# Patient Record
Sex: MALE | Race: WHITE | NOT HISPANIC OR LATINO | Employment: OTHER | URBAN - NONMETROPOLITAN AREA
[De-identification: names, ages, dates, MRNs, and addresses within clinical notes are randomized per-mention and may not be internally consistent; named-entity substitution may affect disease eponyms.]

---

## 2017-02-27 ENCOUNTER — OFFICE VISIT (OUTPATIENT)
Dept: SURGERY | Facility: CLINIC | Age: 72
End: 2017-02-27

## 2017-02-27 VITALS
WEIGHT: 194 LBS | HEIGHT: 73 IN | BODY MASS INDEX: 25.71 KG/M2 | SYSTOLIC BLOOD PRESSURE: 120 MMHG | DIASTOLIC BLOOD PRESSURE: 80 MMHG

## 2017-02-27 DIAGNOSIS — R10.30 CHRONIC GROIN PAIN, UNSPECIFIED LATERALITY: ICD-10-CM

## 2017-02-27 DIAGNOSIS — G89.29 CHRONIC GROIN PAIN, UNSPECIFIED LATERALITY: ICD-10-CM

## 2017-02-27 DIAGNOSIS — Z98.890 HX OF INGUINAL HERNIA REPAIR: Primary | ICD-10-CM

## 2017-02-27 DIAGNOSIS — Z87.19 HX OF INGUINAL HERNIA REPAIR: Primary | ICD-10-CM

## 2017-02-27 PROCEDURE — 99212 OFFICE O/P EST SF 10 MIN: CPT | Performed by: SURGERY

## 2017-02-27 RX ORDER — OXYCODONE HYDROCHLORIDE AND ACETAMINOPHEN 5; 325 MG/1; MG/1
1 TABLET ORAL EVERY 6 HOURS PRN
COMMUNITY
End: 2017-08-28

## 2017-02-27 RX ORDER — SENNOSIDES 8.6 MG
650 CAPSULE ORAL EVERY 8 HOURS PRN
COMMUNITY
End: 2019-03-21

## 2017-02-27 RX ORDER — FAMOTIDINE 20 MG/1
20 TABLET, FILM COATED ORAL DAILY
COMMUNITY

## 2017-02-27 NOTE — PROGRESS NOTES
Chief Complaint   Patient presents with   • Follow-up     Recheck right groin pain.        Abdominal Pain   This is a chronic problem. The current episode started more than 1 year ago. The onset quality is gradual. The problem occurs intermittently. The problem has been gradually improving. The pain is located in the LLQ and RLQ. The abdominal pain does not radiate. Associated symptoms include arthralgias and dysuria. Pertinent negatives include no anorexia, constipation, diarrhea, fever, hematochezia, myalgias, nausea or vomiting. Nothing aggravates the pain. Relieved by: Pain stimulator. The treatment provided moderate relief.       Recent back surgery. No bulges in the groin. Chronic pain, improved with stimulator  Implant.    Past Medical History   Diagnosis Date   • Abdominal pain      no hernias noted- multiple repairs      • Asthma    • Backache    • Cataract    • Constipation    • DJD (degenerative joint disease)      involving multiple joints   • Dyspnea      with exertion   • Emphysema of lung    • Essential hypertension    • Generalized anxiety disorder    • Hallux valgus      right 2nd toe   • Hearing loss    • Heartburn    • Hemorrhoids    • Incisional hernia with obstruction, without gangrene    • Malignant tumor of prostate    • Plantar fasciitis      right heel   • PVD (peripheral vascular disease)    • Sleep disorder    • Urinary incontinence        Past Surgical History   Procedure Laterality Date   • Endoscopy and colonoscopy  09/19/2011     Diverticulosis in sigmoid colon. Hemorrhoids in anus. Edema of rectal mucosa. Multiple biopsies taken. Lipoma in cecum. Multiple biopsies taken   • Transesophageal echocardiogram ([facility])  07/09/2008     without color flow-Normal systolic function with evidence of left ventricular hypertrophy   • Prostatectomy     • Hernia repair  08/17/2004     Left inguinal hernioplasty with mesh   • Umbilical hernia repair  09/12/2006     Primary umbilical hernioplasty          Current Outpatient Prescriptions:   •  acetaminophen (TYLENOL) 650 MG 8 hr tablet, Take 650 mg by mouth., Disp: , Rfl:   •  albuterol (PROVENTIL HFA;VENTOLIN HFA) 108 (90 BASE) MCG/ACT inhaler, Inhale 2 puffs Every 4 (Four) Hours As Needed for wheezing., Disp: , Rfl:   •  aluminum hydroxide-mag trisilicate (GAVISCON) 80-20 MG chewable tablet chewable tablet, Chew 1 tablet 3 (Three) Times a Day., Disp: , Rfl:   •  aspirin 81 MG EC tablet, Take 81 mg by mouth Daily., Disp: , Rfl:   •  azithromycin (ZITHROMAX) 250 MG tablet, , Disp: , Rfl: 6  •  bumetanide (BUMEX) 2 MG tablet, , Disp: , Rfl: 3  •  carvedilol (COREG) 12.5 MG tablet, , Disp: , Rfl:   •  clonazePAM (KlonoPIN) 0.5 MG tablet, , Disp: , Rfl:   •  clopidogrel (PLAVIX) 75 MG tablet, Take 75 mg by mouth Daily., Disp: , Rfl:   •  COMBIVENT RESPIMAT  MCG/ACT inhaler, , Disp: , Rfl: 2  •  cyclobenzaprine (FLEXERIL) 10 MG tablet, Take 10 mg by mouth 3 (Three) Times a Day As Needed for muscle spasms., Disp: , Rfl:   •  DALIRESP 500 MCG tablet tablet, , Disp: , Rfl: 11  •  DULERA 200-5 MCG/ACT inhaler, , Disp: , Rfl: 11  •  famotidine (PEPCID) 20 MG tablet, Take 20 mg by mouth., Disp: , Rfl:   •  furosemide (LASIX) 40 MG tablet, Take 40 mg by mouth Daily., Disp: , Rfl:   •  ipratropium-albuterol (COMBIVENT)  MCG/ACT inhaler, Inhale 2 puffs Every 4 (Four) Hours As Needed for wheezing., Disp: , Rfl:   •  ipratropium-albuterol (DUO-NEB) 0.5-2.5 mg/mL nebulizer, , Disp: , Rfl: 6  •  isosorbide dinitrate (ISORDIL) 30 MG tablet, Take 30 mg by mouth Daily., Disp: , Rfl:   •  isosorbide mononitrate (IMDUR) 30 MG 24 hr tablet, , Disp: , Rfl:   •  lisinopril (PRINIVIL,ZESTRIL) 2.5 MG tablet, Take 2.5 mg by mouth Daily., Disp: , Rfl:   •  Mometasone Furo-Formoterol Fum (DULERA IN), Inhale 2 puffs 2 (Two) Times a Day., Disp: , Rfl:   •  MUCINEX 600 MG 12 hr tablet, , Disp: , Rfl: 4  •  nitroglycerin (NITROSTAT) 0.4 MG SL tablet, Place 0.4 mg under the tongue  Every 5 (Five) Minutes As Needed for chest pain. Take no more than 3 doses in 15 minutes., Disp: , Rfl:   •  oxyCODONE-acetaminophen (PERCOCET) 5-325 MG per tablet, Take 1 tablet by mouth Every 6 (Six) Hours As Needed., Disp: , Rfl:   •  potassium chloride (K-DUR) 10 MEQ CR tablet, , Disp: , Rfl:   •  pravastatin (PRAVACHOL) 40 MG tablet, Take 40 mg by mouth Every Night., Disp: , Rfl:   •  pregabalin (LYRICA) 150 MG capsule, Take 150 mg by mouth 2 (Two) Times a Day., Disp: , Rfl:   •  promethazine (PHENERGAN) 25 MG tablet, , Disp: , Rfl:   •  SPIRIVA RESPIMAT 2.5 MCG/ACT aerosol solution, , Disp: , Rfl: 11  •  sucralfate (CARAFATE) 1 G tablet, Take 1 g by mouth 3 (Three) Times a Day., Disp: , Rfl:   •  carvedilol (COREG) 3.125 MG tablet, Take 3.125 mg by mouth 2 (Two) Times a Day With Meals., Disp: , Rfl:   •  potassium chloride (K-DUR,KLOR-CON) 10 MEQ CR tablet, Take 10 mEq by mouth 2 (Two) Times a Day., Disp: , Rfl:   •  tapentadol (NUCYNTA) 75 MG tablet, Take 75 mg by mouth 4 (Four) Times a Day As Needed., Disp: , Rfl:     Allergies   Allergen Reactions   • Codeine Nausea And Vomiting   • Contrast Dye        Family History   Problem Relation Age of Onset   • Cancer Other    • Diabetes Other    • Heart disease Other    • Hypertension Other        Social History     Social History   • Marital status:      Spouse name: N/A   • Number of children: N/A   • Years of education: N/A     Occupational History   • Not on file.     Social History Main Topics   • Smoking status: Former Smoker   • Smokeless tobacco: Not on file   • Alcohol use No   • Drug use: Not on file   • Sexual activity: Not on file     Other Topics Concern   • Not on file     Social History Narrative       Review of Systems   Constitutional: Negative for fever.   Gastrointestinal: Positive for abdominal pain. Negative for abdominal distention, anal bleeding, anorexia, blood in stool, constipation, diarrhea, hematochezia, nausea and vomiting.    Genitourinary: Positive for dysuria.   Musculoskeletal: Positive for arthralgias and back pain. Negative for gait problem, myalgias and neck pain.       Physical Exam   Abdominal: Soft. Normal appearance and normal aorta.           ASSESSMENT    Claudis was seen today for follow-up.    Diagnoses and all orders for this visit:    Hx of inguinal hernia repair    Chronic groin pain, unspecified laterality      PLAN    1. Recheck in 4 months          This document has been electronically signed by Derik Michelle MD on February 27, 2017 10:47 AM

## 2017-06-26 ENCOUNTER — OFFICE VISIT (OUTPATIENT)
Dept: SURGERY | Facility: CLINIC | Age: 72
End: 2017-06-26

## 2017-06-26 VITALS
HEIGHT: 73 IN | BODY MASS INDEX: 25.98 KG/M2 | WEIGHT: 196 LBS | DIASTOLIC BLOOD PRESSURE: 70 MMHG | SYSTOLIC BLOOD PRESSURE: 140 MMHG

## 2017-06-26 DIAGNOSIS — R10.30 GROIN PAIN, UNSPECIFIED LATERALITY: Primary | ICD-10-CM

## 2017-06-26 PROCEDURE — 99212 OFFICE O/P EST SF 10 MIN: CPT | Performed by: SURGERY

## 2017-06-30 NOTE — PROGRESS NOTES
Chief Complaint   Patient presents with   • Follow-up     Recheck right groin pain        HPI  This is a chronic problem that is unchanged in the last 4 months.. The current episode started more than 1 year ago. The onset quality is gradual. The problem occurs intermittently. The problem has been gradually improving. The pain is located in the LLQ and RLQ. The abdominal pain does not radiate. Associated symptoms include arthralgias and dysuria. Pertinent negatives include no anorexia, constipation, diarrhea, fever, hematochezia, myalgias, nausea or vomiting. Nothing aggravates the pain. Relieved by: Pain stimulator- this has been recently adjusted. The treatment provided moderate relief.    No bules- no hx of incarceration or obstruction.  Past Medical History:   Diagnosis Date   • Abdominal pain     no hernias noted- multiple repairs      • Asthma    • Backache    • Cataract    • Constipation    • DJD (degenerative joint disease)     involving multiple joints   • Dyspnea     with exertion   • Emphysema of lung    • Essential hypertension    • Generalized anxiety disorder    • Hallux valgus     right 2nd toe   • Hearing loss    • Heartburn    • Hemorrhoids    • Incisional hernia with obstruction, without gangrene    • Malignant tumor of prostate    • Plantar fasciitis     right heel   • PVD (peripheral vascular disease)    • Sleep disorder    • Urinary incontinence        Past Surgical History:   Procedure Laterality Date   • ENDOSCOPY AND COLONOSCOPY  09/19/2011    Diverticulosis in sigmoid colon. Hemorrhoids in anus. Edema of rectal mucosa. Multiple biopsies taken. Lipoma in cecum. Multiple biopsies taken   • HERNIA REPAIR  08/17/2004    Left inguinal hernioplasty with mesh   • PROSTATECTOMY     • TRANSESOPHAGEAL ECHOCARDIOGRAM ([place])  07/09/2008    without color flow-Normal systolic function with evidence of left ventricular hypertrophy   • UMBILICAL HERNIA REPAIR  09/12/2006    Primary umbilical hernioplasty          Current Outpatient Prescriptions:   •  acetaminophen (TYLENOL) 650 MG 8 hr tablet, Take 650 mg by mouth., Disp: , Rfl:   •  albuterol (PROVENTIL HFA;VENTOLIN HFA) 108 (90 BASE) MCG/ACT inhaler, Inhale 2 puffs Every 4 (Four) Hours As Needed for wheezing., Disp: , Rfl:   •  aluminum hydroxide-mag trisilicate (GAVISCON) 80-20 MG chewable tablet chewable tablet, Chew 1 tablet 3 (Three) Times a Day., Disp: , Rfl:   •  aspirin 81 MG EC tablet, Take 81 mg by mouth Daily., Disp: , Rfl:   •  azithromycin (ZITHROMAX) 250 MG tablet, , Disp: , Rfl: 6  •  bumetanide (BUMEX) 2 MG tablet, , Disp: , Rfl: 3  •  carvedilol (COREG) 12.5 MG tablet, , Disp: , Rfl:   •  carvedilol (COREG) 3.125 MG tablet, Take 3.125 mg by mouth 2 (Two) Times a Day With Meals., Disp: , Rfl:   •  clonazePAM (KlonoPIN) 0.5 MG tablet, , Disp: , Rfl:   •  clopidogrel (PLAVIX) 75 MG tablet, Take 75 mg by mouth Daily., Disp: , Rfl:   •  COMBIVENT RESPIMAT  MCG/ACT inhaler, , Disp: , Rfl: 2  •  cyclobenzaprine (FLEXERIL) 10 MG tablet, Take 10 mg by mouth 3 (Three) Times a Day As Needed for muscle spasms., Disp: , Rfl:   •  DALIRESP 500 MCG tablet tablet, , Disp: , Rfl: 11  •  DULERA 200-5 MCG/ACT inhaler, , Disp: , Rfl: 11  •  famotidine (PEPCID) 20 MG tablet, Take 20 mg by mouth., Disp: , Rfl:   •  furosemide (LASIX) 40 MG tablet, Take 40 mg by mouth Daily., Disp: , Rfl:   •  ipratropium-albuterol (COMBIVENT)  MCG/ACT inhaler, Inhale 2 puffs Every 4 (Four) Hours As Needed for wheezing., Disp: , Rfl:   •  ipratropium-albuterol (DUO-NEB) 0.5-2.5 mg/mL nebulizer, , Disp: , Rfl: 6  •  isosorbide dinitrate (ISORDIL) 30 MG tablet, Take 30 mg by mouth Daily., Disp: , Rfl:   •  isosorbide mononitrate (IMDUR) 30 MG 24 hr tablet, , Disp: , Rfl:   •  lisinopril (PRINIVIL,ZESTRIL) 2.5 MG tablet, Take 2.5 mg by mouth Daily., Disp: , Rfl:   •  Mometasone Furo-Formoterol Fum (DULERA IN), Inhale 2 puffs 2 (Two) Times a Day., Disp: , Rfl:   •  MUCINEX 600 MG  12 hr tablet, , Disp: , Rfl: 4  •  nitroglycerin (NITROSTAT) 0.4 MG SL tablet, Place 0.4 mg under the tongue Every 5 (Five) Minutes As Needed for chest pain. Take no more than 3 doses in 15 minutes., Disp: , Rfl:   •  oxyCODONE-acetaminophen (PERCOCET) 5-325 MG per tablet, Take 1 tablet by mouth Every 6 (Six) Hours As Needed., Disp: , Rfl:   •  potassium chloride (K-DUR) 10 MEQ CR tablet, , Disp: , Rfl:   •  potassium chloride (K-DUR,KLOR-CON) 10 MEQ CR tablet, Take 10 mEq by mouth 2 (Two) Times a Day., Disp: , Rfl:   •  pravastatin (PRAVACHOL) 40 MG tablet, Take 40 mg by mouth Every Night., Disp: , Rfl:   •  pregabalin (LYRICA) 150 MG capsule, Take 150 mg by mouth 2 (Two) Times a Day., Disp: , Rfl:   •  promethazine (PHENERGAN) 25 MG tablet, , Disp: , Rfl:   •  SPIRIVA RESPIMAT 2.5 MCG/ACT aerosol solution, , Disp: , Rfl: 11  •  sucralfate (CARAFATE) 1 G tablet, Take 1 g by mouth 3 (Three) Times a Day., Disp: , Rfl:   •  tapentadol (NUCYNTA) 75 MG tablet, Take 75 mg by mouth 4 (Four) Times a Day As Needed., Disp: , Rfl:     Allergies   Allergen Reactions   • Codeine Nausea And Vomiting   • Contrast Dye        Family History   Problem Relation Age of Onset   • Cancer Other    • Diabetes Other    • Heart disease Other    • Hypertension Other        Social History     Social History   • Marital status:      Spouse name: N/A   • Number of children: N/A   • Years of education: N/A     Occupational History   • Not on file.     Social History Main Topics   • Smoking status: Former Smoker   • Smokeless tobacco: Not on file   • Alcohol use No   • Drug use: Not on file   • Sexual activity: Not on file     Other Topics Concern   • Not on file     Social History Narrative       Review of Systems  Nothing ot add  Physical Exam   Abdominal: Soft. Bowel sounds are normal. He exhibits no distension and no mass. There is no tenderness. There is no rebound and no guarding.         ASSESSMENT    Angelita was seen today for  follow-up.    Diagnoses and all orders for this visit:    Groin pain, unspecified laterality      PLAN    1. Recheck in 4 months          This document has been electronically signed by Derik Michelle MD on June 29, 2017 10:14 PM

## 2017-08-28 ENCOUNTER — OFFICE VISIT (OUTPATIENT)
Dept: SURGERY | Facility: CLINIC | Age: 72
End: 2017-08-28

## 2017-08-28 VITALS
HEIGHT: 73 IN | SYSTOLIC BLOOD PRESSURE: 124 MMHG | WEIGHT: 188 LBS | DIASTOLIC BLOOD PRESSURE: 68 MMHG | BODY MASS INDEX: 24.92 KG/M2

## 2017-08-28 DIAGNOSIS — K43.2 INCISIONAL HERNIA, WITHOUT OBSTRUCTION OR GANGRENE: Primary | ICD-10-CM

## 2017-08-28 PROCEDURE — 99213 OFFICE O/P EST LOW 20 MIN: CPT | Performed by: SURGERY

## 2017-08-28 RX ORDER — ALBUTEROL SULFATE 2.5 MG/3ML
2.5 SOLUTION RESPIRATORY (INHALATION)
COMMUNITY
Start: 2015-04-16

## 2017-08-28 RX ORDER — BUDESONIDE 0.5 MG/2ML
0.5 INHALANT ORAL
COMMUNITY
Start: 2017-06-21 | End: 2017-11-08 | Stop reason: ALTCHOICE

## 2017-08-28 RX ORDER — AZITHROMYCIN 250 MG/1
1 TABLET, FILM COATED ORAL EVERY OTHER DAY
Status: ON HOLD | COMMUNITY
Start: 2017-07-14 | End: 2017-11-14

## 2017-08-28 RX ORDER — KETOCONAZOLE 20 MG/ML
SHAMPOO TOPICAL DAILY
COMMUNITY
Start: 2017-08-22 | End: 2019-01-24

## 2017-08-28 NOTE — PROGRESS NOTES
Chief Complaint   Patient presents with   • Follow-up     Recheck right groin pain.        HPI  RIGHT flank bulge at the site of robotic cannula. Hx of multiple abdominal and groin hernias. Moderate pain in the new hernia site.  Prostate cancer- robotic surgery, no RT. No hx of incarceration or obstruction.    Past Medical History:   Diagnosis Date   • Abdominal pain     no hernias noted- multiple repairs      • Asthma    • Backache    • Cataract    • Constipation    • DJD (degenerative joint disease)     involving multiple joints   • Dyspnea     with exertion   • Emphysema of lung    • Essential hypertension    • Generalized anxiety disorder    • Hallux valgus     right 2nd toe   • Hearing loss    • Heartburn    • Hemorrhoids    • Incisional hernia with obstruction, without gangrene    • Malignant tumor of prostate    • Plantar fasciitis     right heel   • PVD (peripheral vascular disease)    • Sleep disorder    • Urinary incontinence        Past Surgical History:   Procedure Laterality Date   • ENDOSCOPY AND COLONOSCOPY  09/19/2011    Diverticulosis in sigmoid colon. Hemorrhoids in anus. Edema of rectal mucosa. Multiple biopsies taken. Lipoma in cecum. Multiple biopsies taken   • HERNIA REPAIR  08/17/2004    Left inguinal hernioplasty with mesh   • PROSTATECTOMY     • TRANSESOPHAGEAL ECHOCARDIOGRAM ([place])  07/09/2008    without color flow-Normal systolic function with evidence of left ventricular hypertrophy   • UMBILICAL HERNIA REPAIR  09/12/2006    Primary umbilical hernioplasty         Current Outpatient Prescriptions:   •  acetaminophen (TYLENOL) 650 MG 8 hr tablet, Take 650 mg by mouth., Disp: , Rfl:   •  albuterol (PROVENTIL HFA;VENTOLIN HFA) 108 (90 BASE) MCG/ACT inhaler, Inhale 2 puffs Every 4 (Four) Hours As Needed for wheezing., Disp: , Rfl:   •  albuterol (PROVENTIL) (2.5 MG/3ML) 0.083% nebulizer solution, Inhale 2.5 mg., Disp: , Rfl:   •  aluminum hydroxide-mag trisilicate (GAVISCON) 80-20 MG chewable  tablet chewable tablet, Chew 1 tablet 3 (Three) Times a Day., Disp: , Rfl:   •  aspirin 81 MG EC tablet, Take 81 mg by mouth Daily., Disp: , Rfl:   •  azithromycin (ZITHROMAX) 250 MG tablet, Take 1 tablet by mouth Every Other Day., Disp: , Rfl:   •  budesonide (PULMICORT) 0.5 MG/2ML nebulizer solution, Inhale 0.5 mg., Disp: , Rfl:   •  bumetanide (BUMEX) 2 MG tablet, , Disp: , Rfl: 3  •  carvedilol (COREG) 12.5 MG tablet, , Disp: , Rfl:   •  clonazePAM (KlonoPIN) 0.5 MG tablet, , Disp: , Rfl:   •  clopidogrel (PLAVIX) 75 MG tablet, Take 75 mg by mouth Daily., Disp: , Rfl:   •  COMBIVENT RESPIMAT  MCG/ACT inhaler, , Disp: , Rfl: 2  •  cyclobenzaprine (FLEXERIL) 10 MG tablet, Take 10 mg by mouth 3 (Three) Times a Day As Needed for muscle spasms., Disp: , Rfl:   •  DALIRESP 500 MCG tablet tablet, , Disp: , Rfl: 11  •  DULERA 200-5 MCG/ACT inhaler, , Disp: , Rfl: 11  •  famotidine (PEPCID) 20 MG tablet, Take 20 mg by mouth., Disp: , Rfl:   •  furosemide (LASIX) 40 MG tablet, Take 40 mg by mouth Daily., Disp: , Rfl:   •  ipratropium-albuterol (COMBIVENT)  MCG/ACT inhaler, Inhale 2 puffs Every 4 (Four) Hours As Needed for wheezing., Disp: , Rfl:   •  ipratropium-albuterol (DUO-NEB) 0.5-2.5 mg/mL nebulizer, , Disp: , Rfl: 6  •  isosorbide dinitrate (ISORDIL) 30 MG tablet, Take 30 mg by mouth Daily., Disp: , Rfl:   •  ketoconazole (NIZORAL) 2 % shampoo, Apply  topically Daily., Disp: , Rfl:   •  lisinopril (PRINIVIL,ZESTRIL) 2.5 MG tablet, Take 2.5 mg by mouth Daily., Disp: , Rfl:   •  Mometasone Furo-Formoterol Fum (DULERA IN), Inhale 2 puffs 2 (Two) Times a Day., Disp: , Rfl:   •  MUCINEX 600 MG 12 hr tablet, , Disp: , Rfl: 4  •  nitroglycerin (NITROSTAT) 0.4 MG SL tablet, Place 0.4 mg under the tongue Every 5 (Five) Minutes As Needed for chest pain. Take no more than 3 doses in 15 minutes., Disp: , Rfl:   •  O2 (OXYGEN), Inhale 2 L/min Daily., Disp: , Rfl:   •  potassium chloride (K-DUR) 10 MEQ CR tablet, ,  Disp: , Rfl:   •  pregabalin (LYRICA) 150 MG capsule, Take 150 mg by mouth 2 (Two) Times a Day., Disp: , Rfl:   •  promethazine (PHENERGAN) 25 MG tablet, , Disp: , Rfl:   •  SPIRIVA RESPIMAT 2.5 MCG/ACT aerosol solution, , Disp: , Rfl: 11  •  sucralfate (CARAFATE) 1 G tablet, Take 1 g by mouth 3 (Three) Times a Day., Disp: , Rfl:   •  tapentadol (NUCYNTA) 75 MG tablet, Take 75 mg by mouth 4 (Four) Times a Day As Needed., Disp: , Rfl:   •  carvedilol (COREG) 3.125 MG tablet, Take 3.125 mg by mouth 2 (Two) Times a Day With Meals., Disp: , Rfl:   •  pravastatin (PRAVACHOL) 40 MG tablet, Take 40 mg by mouth Every Night., Disp: , Rfl:     Allergies   Allergen Reactions   • Codeine Nausea And Vomiting   • Contrast Dye        Family History   Problem Relation Age of Onset   • Cancer Other    • Diabetes Other    • Heart disease Other    • Hypertension Other        Social History     Social History   • Marital status:        Social History Main Topics   • Smoking status: Former Smoker   • Smokeless tobacco: No   • Alcohol use No   • Drug use: No       Review of Systems   Constitutional: Negative for activity change, appetite change, chills and fever.   HENT: Negative for hearing loss, nosebleeds and trouble swallowing.    Respiratory: Positive for shortness of breath. Negative for apnea, cough and chest tightness.    Cardiovascular: Negative for chest pain, palpitations and leg swelling.   Gastrointestinal: Negative for abdominal distention, abdominal pain, anal bleeding, blood in stool, constipation, diarrhea, nausea, rectal pain and vomiting.   Endocrine: Negative for cold intolerance, heat intolerance, polydipsia and polyuria.   Genitourinary: Negative for decreased urine volume, difficulty urinating, dysuria, enuresis, frequency, hematuria and urgency.   Musculoskeletal: Positive for back pain and neck pain. Negative for arthralgias, gait problem and myalgias.   Skin: Negative for pallor, rash and wound.    Allergic/Immunologic: Negative for immunocompromised state.   Neurological: Negative for dizziness, seizures, weakness, light-headedness, numbness and headaches.   Psychiatric/Behavioral: Negative for agitation and behavioral problems. The patient is not nervous/anxious.        Physical Exam   Constitutional: He appears well-developed and well-nourished. No distress.   HENT:   Head: Normocephalic and atraumatic.   Eyes: EOM are normal. Pupils are equal, round, and reactive to light.   Neck: Normal range of motion. Neck supple. No JVD present. No tracheal deviation present. No thyromegaly present.   Cardiovascular: Normal rate and regular rhythm.    Pulmonary/Chest: Effort normal and breath sounds normal. No stridor. No respiratory distress. He has no wheezes. He has no rales. He exhibits no tenderness.   Abdominal: Soft. He exhibits no distension and no mass. There is no tenderness. There is no rebound and no guarding. A hernia (RIGHT flank) is present.       Lymphadenopathy:     He has no cervical adenopathy.     He has no axillary adenopathy.   Skin: Skin is warm, dry and intact. No lesion noted. He is not diaphoretic. No erythema. No pallor.   Psychiatric: He has a normal mood and affect. His speech is normal and behavior is normal. Judgment and thought content normal. Cognition and memory are normal.   Vitals reviewed.        ASSESSMENT    Angelita was seen today for follow-up.    Diagnoses and all orders for this visit:    Incisional hernia, without obstruction or gangrene      PLAN    1. Recheck in 1 month  2. Consider repair at that time          This document has been electronically signed by Derik Michelle MD on August 28, 2017 9:21 AM

## 2017-10-26 ENCOUNTER — OFFICE VISIT (OUTPATIENT)
Dept: SURGERY | Facility: CLINIC | Age: 72
End: 2017-10-26

## 2017-10-26 VITALS
BODY MASS INDEX: 24.32 KG/M2 | SYSTOLIC BLOOD PRESSURE: 120 MMHG | HEIGHT: 73 IN | WEIGHT: 183.5 LBS | DIASTOLIC BLOOD PRESSURE: 60 MMHG

## 2017-10-26 DIAGNOSIS — K43.2 INCISIONAL HERNIA, WITHOUT OBSTRUCTION OR GANGRENE: Primary | ICD-10-CM

## 2017-10-26 PROCEDURE — 99213 OFFICE O/P EST LOW 20 MIN: CPT | Performed by: SURGERY

## 2017-10-26 RX ORDER — ACETAMINOPHEN 325 MG/1
650 TABLET ORAL ONCE
Status: CANCELLED | OUTPATIENT
Start: 2017-11-14 | End: 2017-10-26

## 2017-10-26 RX ORDER — SODIUM CHLORIDE, SODIUM LACTATE, POTASSIUM CHLORIDE, CALCIUM CHLORIDE 600; 310; 30; 20 MG/100ML; MG/100ML; MG/100ML; MG/100ML
100 INJECTION, SOLUTION INTRAVENOUS CONTINUOUS
Status: CANCELLED | OUTPATIENT
Start: 2017-11-14

## 2017-10-26 NOTE — PROGRESS NOTES
Chief Complaint   Patient presents with   • Follow-up     Recheck right groin pain.        HPI  RIGHT abdominal wall hernia at a port site for a prostatectomy. No hx of incarceration or intestinal obstruction. Hernia has increased in size since the last visit.  Past Medical History:   Diagnosis Date   • Abdominal pain     no hernias noted- multiple repairs      • Asthma    • Backache    • Cataract    • Constipation    • DJD (degenerative joint disease)     involving multiple joints   • Dyspnea     with exertion   • Emphysema of lung    • Essential hypertension    • Generalized anxiety disorder    • Hallux valgus     right 2nd toe   • Hearing loss    • Heartburn    • Hemorrhoids    • Incisional hernia with obstruction, without gangrene    • Malignant tumor of prostate    • Plantar fasciitis     right heel   • PVD (peripheral vascular disease)    • Sleep disorder    • Urinary incontinence        Past Surgical History:   Procedure Laterality Date   • ENDOSCOPY AND COLONOSCOPY  09/19/2011    Diverticulosis in sigmoid colon. Hemorrhoids in anus. Edema of rectal mucosa. Multiple biopsies taken. Lipoma in cecum. Multiple biopsies taken   • HERNIA REPAIR  08/17/2004    Left inguinal hernioplasty with mesh   • PROSTATECTOMY     • TRANSESOPHAGEAL ECHOCARDIOGRAM ([place]RRA[place])  07/09/2008    without color flow-Normal systolic function with evidence of left ventricular hypertrophy   • UMBILICAL HERNIA REPAIR  09/12/2006    Primary umbilical hernioplasty         Current Outpatient Prescriptions:   •  acetaminophen (TYLENOL) 650 MG 8 hr tablet, Take 650 mg by mouth., Disp: , Rfl:   •  albuterol (PROVENTIL HFA;VENTOLIN HFA) 108 (90 BASE) MCG/ACT inhaler, Inhale 2 puffs Every 4 (Four) Hours As Needed for wheezing., Disp: , Rfl:   •  albuterol (PROVENTIL) (2.5 MG/3ML) 0.083% nebulizer solution, Inhale 2.5 mg., Disp: , Rfl:   •  aluminum hydroxide-mag trisilicate (GAVISCON) 80-20 MG chewable tablet chewable tablet, Chew 1 tablet 3 (Three)  Times a Day., Disp: , Rfl:   •  aspirin 81 MG EC tablet, Take 81 mg by mouth Daily., Disp: , Rfl:   •  azithromycin (ZITHROMAX) 250 MG tablet, Take 1 tablet by mouth Every Other Day., Disp: , Rfl:   •  budesonide (PULMICORT) 0.5 MG/2ML nebulizer solution, Inhale 0.5 mg., Disp: , Rfl:   •  bumetanide (BUMEX) 2 MG tablet, , Disp: , Rfl: 3  •  carvedilol (COREG) 12.5 MG tablet, , Disp: , Rfl:   •  carvedilol (COREG) 3.125 MG tablet, Take 3.125 mg by mouth 2 (Two) Times a Day With Meals., Disp: , Rfl:   •  clonazePAM (KlonoPIN) 0.5 MG tablet, , Disp: , Rfl:   •  clopidogrel (PLAVIX) 75 MG tablet, Take 75 mg by mouth Daily., Disp: , Rfl:   •  COMBIVENT RESPIMAT  MCG/ACT inhaler, , Disp: , Rfl: 2  •  cyclobenzaprine (FLEXERIL) 10 MG tablet, Take 10 mg by mouth 3 (Three) Times a Day As Needed for muscle spasms., Disp: , Rfl:   •  DALIRESP 500 MCG tablet tablet, , Disp: , Rfl: 11  •  DULERA 200-5 MCG/ACT inhaler, , Disp: , Rfl: 11  •  famotidine (PEPCID) 20 MG tablet, Take 20 mg by mouth., Disp: , Rfl:   •  furosemide (LASIX) 40 MG tablet, Take 40 mg by mouth Daily., Disp: , Rfl:   •  ipratropium-albuterol (COMBIVENT)  MCG/ACT inhaler, Inhale 2 puffs Every 4 (Four) Hours As Needed for wheezing., Disp: , Rfl:   •  ipratropium-albuterol (DUO-NEB) 0.5-2.5 mg/mL nebulizer, , Disp: , Rfl: 6  •  isosorbide dinitrate (ISORDIL) 30 MG tablet, Take 30 mg by mouth Daily., Disp: , Rfl:   •  ketoconazole (NIZORAL) 2 % shampoo, Apply  topically Daily., Disp: , Rfl:   •  lisinopril (PRINIVIL,ZESTRIL) 2.5 MG tablet, Take 2.5 mg by mouth Daily., Disp: , Rfl:   •  Mometasone Furo-Formoterol Fum (DULERA IN), Inhale 2 puffs 2 (Two) Times a Day., Disp: , Rfl:   •  MUCINEX 600 MG 12 hr tablet, , Disp: , Rfl: 4  •  nitroglycerin (NITROSTAT) 0.4 MG SL tablet, Place 0.4 mg under the tongue Every 5 (Five) Minutes As Needed for chest pain. Take no more than 3 doses in 15 minutes., Disp: , Rfl:   •  O2 (OXYGEN), Inhale 2 L/min Daily.,  Disp: , Rfl:   •  potassium chloride (K-DUR) 10 MEQ CR tablet, , Disp: , Rfl:   •  pravastatin (PRAVACHOL) 40 MG tablet, Take 40 mg by mouth Every Night., Disp: , Rfl:   •  pregabalin (LYRICA) 150 MG capsule, Take 150 mg by mouth 2 (Two) Times a Day., Disp: , Rfl:   •  promethazine (PHENERGAN) 25 MG tablet, , Disp: , Rfl:   •  SPIRIVA RESPIMAT 2.5 MCG/ACT aerosol solution, , Disp: , Rfl: 11  •  sucralfate (CARAFATE) 1 G tablet, Take 1 g by mouth 3 (Three) Times a Day., Disp: , Rfl:   •  tapentadol (NUCYNTA) 75 MG tablet, Take 75 mg by mouth 4 (Four) Times a Day As Needed., Disp: , Rfl:     Allergies   Allergen Reactions   • Codeine Nausea And Vomiting   • Contrast Dye        Family History   Problem Relation Age of Onset   • Cancer Other    • Diabetes Other    • Heart disease Other    • Hypertension Other        Social History     Social History   • Marital status:      Spouse name: N/A   • Number of children: N/A   • Years of education: N/A     Occupational History   • Not on file.     Social History Main Topics   • Smoking status: Former Smoker   • Smokeless tobacco: Not on file   • Alcohol use No   • Drug use: Not on file   • Sexual activity: Not on file     Other Topics Concern   • Not on file     Social History Narrative       Review of Systems   Constitutional: Negative for activity change, appetite change, chills and fever.   HENT: Negative for hearing loss, nosebleeds and trouble swallowing.    Cardiovascular: Negative for chest pain, palpitations and leg swelling.   Gastrointestinal: Positive for abdominal pain. Negative for abdominal distention, anal bleeding, blood in stool, constipation, diarrhea, nausea, rectal pain and vomiting.   Endocrine: Negative for cold intolerance, heat intolerance, polydipsia and polyuria.   Genitourinary: Negative for decreased urine volume, difficulty urinating, dysuria, enuresis, frequency, hematuria and urgency.   Musculoskeletal: Positive for back pain. Negative for  arthralgias, gait problem, myalgias and neck pain.        Knee pain   Skin: Negative for pallor, rash and wound.   Allergic/Immunologic: Negative for immunocompromised state.   Neurological: Negative for dizziness, seizures, weakness, light-headedness, numbness and headaches.   Psychiatric/Behavioral: Negative for agitation and behavioral problems. The patient is not nervous/anxious.        Physical Exam   Constitutional: He appears well-developed and well-nourished.   HENT:   Head: Normocephalic and atraumatic.   Eyes: EOM are normal. Pupils are equal, round, and reactive to light.   Neck: Normal range of motion. Neck supple. No JVD present. No tracheal deviation present. No thyromegaly present.   Cardiovascular: Normal rate and regular rhythm.    Pulmonary/Chest: Effort normal and breath sounds normal. No stridor.   Abdominal: Soft. Bowel sounds are normal. He exhibits no distension and no mass. There is no tenderness. There is no rebound and no guarding. A hernia is present.       Lymphadenopathy:     He has no cervical adenopathy.     He has no axillary adenopathy.   Vitals reviewed.        ASSESSMENT    Angelita was seen today for follow-up.    Diagnoses and all orders for this visit:    Incisional hernia, without obstruction or gangrene  -     CBC & Differential; Future  -     Basic Metabolic Panel; Future  -     ECG 12 Lead; Future  -     lactated ringers infusion; Infuse 100 mL/hr into a venous catheter Continuous.  -     ceFAZolin (ANCEF) 2 g in sodium chloride 0.9 % 100 mL IVPB; Infuse 2 g into a venous catheter 1 (One) Time.  -     acetaminophen (TYLENOL) tablet 650 mg; Take 2 tablets by mouth 1 (One) Time.  -     Case Request; Standing  -     Case Request  -     Ambulatory Referral to Cardiology    Other orders  -     Follow Anesthesia Guidelines / Standing Orders; Future  -     Provide NPO Instructions to Patient  -     Follow Anesthesia Guidelines / Standing Orders; Standing  -     Verify NPO Status;  "Standing  -     Obtain Informed Consent; Standing  -     SCD (Sequential Compression Device) - Place on Patient in Pre-Op; Standing  -     Clip Operative Site; Standing        PLAN    1. Pre op cardiology evaluation- Dr. Lobo in [place]  2. Stop Plavix 5 days before surgery  2. Laparoscopic possible open repair of RIGHT abdominal wall hernia with mesh.    What are the indications that have led your doctor to the opinion that an operation is necessary?    A symptomatic, enlarging bulge is present for which operation has been suggested.    What, if any, alternative treatments are available for your condition?    Alternatives to surgery have been explained including watchful waiting, noting that patients who are asymptomatic or \"minimally symptomatic\" may be managed without surgical intervention. Will not solve the problem.    What will be the likely result if you don't have the operation?    Hernias may grow in size, or become tender, incarcerated, or cause intestinal obstruction. While the risk of these events is low, the risk with symptomatic hernias is higher.     What are the basic procedures involved in the operation?    A small incision or incisions are made and the defect is repaired and supported with permanent mesh.     What are the risks?    There are risks of bleeding, infection requiring antibiotics and possibly further surgery, injury to nearby structures, nerve injury, wound complications, recurrence of hernia. The risk of chronic pain may be as high as 10%, although the risk debilitating pain is approximately 2%. Urinary retention requiring catheterization may occur due to spasm of the bladder muscles in 1 in 100, and is more common in elderly males.  Events such as severe bleeding, the need for blood transfusion(s), heart irregularity or stoppage may occur, but are uncommon.  Bleeding is more common if  blood thinning drugs (such as Warfarin, Asprin, Clopidogrel or Dipyridamole)  are taken. Blood " clot in the leg (DVT) causing pain and swelling is possible. In rare cases part of the clot may break off and go to the lungs.   Any complication may require a prolonged hospitalization, a modified incision or additional surgery.    How is the operation expected to improve your health or quality of life?    Operations will decrease risks of serious events. It will relieve the discomfort of bulging.    Is hospitalization necessary and, if so, how long can you expect to be hospitalized?    The operation is usually performed on an outpatient basis under general anesthesia. Occasionally, overnight stay is necessary due to medical co-morbidities, the nature of the operation, or pain control.    What can you expect during your recovery period?    Incisional pain controlled is controlled with a combination of narcotic and non-narcotic oral pain medicines. The incisional areas may become swollen and bruised.       When can you expect to resume normal activities?    Activities (except lifting and straining) may be resumed within a few days. There is to be no lifting over 5 pounds for 6 weeks.    Are there likely to be residual effects from the operation?    Usually none, although pain and numbness are possible.     All questions were answered. The patient agrees to operation.            This document has been electronically signed by Derik Michelle MD on October 26, 2017 1:44 PM

## 2017-11-08 ENCOUNTER — APPOINTMENT (OUTPATIENT)
Dept: PREADMISSION TESTING | Facility: HOSPITAL | Age: 72
End: 2017-11-08

## 2017-11-08 VITALS
OXYGEN SATURATION: 95 % | SYSTOLIC BLOOD PRESSURE: 136 MMHG | BODY MASS INDEX: 25.76 KG/M2 | HEART RATE: 70 BPM | DIASTOLIC BLOOD PRESSURE: 68 MMHG | HEIGHT: 71 IN | WEIGHT: 184 LBS | RESPIRATION RATE: 16 BRPM

## 2017-11-08 DIAGNOSIS — K43.2 INCISIONAL HERNIA, WITHOUT OBSTRUCTION OR GANGRENE: ICD-10-CM

## 2017-11-08 LAB
ANION GAP SERPL CALCULATED.3IONS-SCNC: 14 MMOL/L (ref 5–15)
BASOPHILS # BLD AUTO: 0.02 10*3/MM3 (ref 0–0.2)
BASOPHILS NFR BLD AUTO: 0.3 % (ref 0–2)
BUN BLD-MCNC: 15 MG/DL (ref 7–21)
BUN/CREAT SERPL: 17.2 (ref 7–25)
CALCIUM SPEC-SCNC: 9.7 MG/DL (ref 8.4–10.2)
CHLORIDE SERPL-SCNC: 100 MMOL/L (ref 95–110)
CO2 SERPL-SCNC: 31 MMOL/L (ref 22–31)
CREAT BLD-MCNC: 0.87 MG/DL (ref 0.7–1.3)
DEPRECATED RDW RBC AUTO: 53.1 FL (ref 35.1–43.9)
EOSINOPHIL # BLD AUTO: 0.21 10*3/MM3 (ref 0–0.7)
EOSINOPHIL NFR BLD AUTO: 2.7 % (ref 0–7)
ERYTHROCYTE [DISTWIDTH] IN BLOOD BY AUTOMATED COUNT: 16 % (ref 11.5–14.5)
GFR SERPL CREATININE-BSD FRML MDRD: 87 ML/MIN/1.73 (ref 42–98)
GLUCOSE BLD-MCNC: 85 MG/DL (ref 60–100)
HCT VFR BLD AUTO: 43 % (ref 39–49)
HGB BLD-MCNC: 14.4 G/DL (ref 13.7–17.3)
IMM GRANULOCYTES # BLD: 0.02 10*3/MM3 (ref 0–0.02)
IMM GRANULOCYTES NFR BLD: 0.3 % (ref 0–0.5)
LYMPHOCYTES # BLD AUTO: 1.53 10*3/MM3 (ref 0.6–4.2)
LYMPHOCYTES NFR BLD AUTO: 19.5 % (ref 10–50)
MCH RBC QN AUTO: 30.6 PG (ref 26.5–34)
MCHC RBC AUTO-ENTMCNC: 33.5 G/DL (ref 31.5–36.3)
MCV RBC AUTO: 91.5 FL (ref 80–98)
MONOCYTES # BLD AUTO: 0.54 10*3/MM3 (ref 0–0.9)
MONOCYTES NFR BLD AUTO: 6.9 % (ref 0–12)
MRSA DNA SPEC QL NAA+PROBE: NEGATIVE
NEUTROPHILS # BLD AUTO: 5.54 10*3/MM3 (ref 2–8.6)
NEUTROPHILS NFR BLD AUTO: 70.3 % (ref 37–80)
PLATELET # BLD AUTO: 202 10*3/MM3 (ref 150–450)
PMV BLD AUTO: 11 FL (ref 8–12)
POTASSIUM BLD-SCNC: 3.6 MMOL/L (ref 3.5–5.1)
RBC # BLD AUTO: 4.7 10*6/MM3 (ref 4.37–5.74)
SODIUM BLD-SCNC: 145 MMOL/L (ref 137–145)
WBC NRBC COR # BLD: 7.86 10*3/MM3 (ref 3.2–9.8)

## 2017-11-08 PROCEDURE — 93010 ELECTROCARDIOGRAM REPORT: CPT | Performed by: INTERNAL MEDICINE

## 2017-11-08 PROCEDURE — 87641 MR-STAPH DNA AMP PROBE: CPT | Performed by: SURGERY

## 2017-11-08 PROCEDURE — 93005 ELECTROCARDIOGRAM TRACING: CPT

## 2017-11-08 PROCEDURE — 85025 COMPLETE CBC W/AUTO DIFF WBC: CPT | Performed by: SURGERY

## 2017-11-08 PROCEDURE — 80048 BASIC METABOLIC PNL TOTAL CA: CPT | Performed by: SURGERY

## 2017-11-08 PROCEDURE — 36415 COLL VENOUS BLD VENIPUNCTURE: CPT

## 2017-11-08 RX ORDER — ISOSORBIDE MONONITRATE 30 MG/1
30 TABLET, EXTENDED RELEASE ORAL DAILY
COMMUNITY

## 2017-11-08 NOTE — DISCHARGE INSTRUCTIONS
[facility]  Pre-op Information and Guidelines    You will be called after 2 p.m. the day before your surgery (Friday for Monday surgery) and notified of your time for arrival and approximate surgery time.  If you have not received a call by 4P.M., please contact Same Day Surgery at [phone] of if outside [facility] call [phone].    Please Follow these Important Safety Guidelines:    • The morning of your procedure, take only the medications listed below with   A sip of water:_____________________________________________       _INHALERS, COREG, IMDUR, DALIRESP, PEPCID__    • DO NOT eat or drink anything after 12:00 midnight the night before surgery  Specific instructions concerning drinking clear liquids will be discussed during  the pre-surgery instruction call the day before your surgery.    • If you take a blood thinner (ex. Plavix, Coumadin, aspirin), ask your doctor when to stop it before surgery  STOP DATE: _________________    • Only 2 visitors are allowed in patient rooms at a time  Your visitors will be asked to wait in the lobby until the admission process is complete with the exception of a parent with a child and patients in need of special assistance.    • YOU CANNOT DRIVE YOURSELF HOME  You must be accompanied by someone who will be responsible for driving you home after surgery and for your care at home.    • DO NOT chew gum, use breath mints, hard candy, or smoke the day of surgery  • DO NOT drink alcohol for at least 24 hours before your surgery  • DO NOT wear any jewelry and remove all body piercing before coming to the hospital  • DO NOT wear make-up to the hospital  • If you are having surgery on an extremity (arm/leg/foot) remove nail polish/artificial nails on the surgical side  • Clothing, glasses, contacts, dentures, and hairpieces must be removed before surgery  • Bathe the night before or the morning of your surgery and do not use powders/lotions on  skin.

## 2017-11-08 NOTE — PAT
Patient has appointment tomorrow with [facility], for cardiac clearance. Copy of EKG sent with patient.

## 2017-11-13 ENCOUNTER — ANESTHESIA EVENT (OUTPATIENT)
Dept: PERIOP | Facility: HOSPITAL | Age: 72
End: 2017-11-13

## 2017-11-14 ENCOUNTER — ANESTHESIA (OUTPATIENT)
Dept: PERIOP | Facility: HOSPITAL | Age: 72
End: 2017-11-14

## 2017-11-14 ENCOUNTER — HOSPITAL ENCOUNTER (OUTPATIENT)
Facility: HOSPITAL | Age: 72
Discharge: HOME | End: 2017-11-15
Attending: SURGERY | Admitting: SURGERY

## 2017-11-14 DIAGNOSIS — K43.2 INCISIONAL HERNIA, WITHOUT OBSTRUCTION OR GANGRENE: ICD-10-CM

## 2017-11-14 PROCEDURE — 63710000001 SUCRALFATE 1 G TABLET: Performed by: SURGERY

## 2017-11-14 PROCEDURE — 25010000002 MIDAZOLAM PER 1 MG: Performed by: NURSE ANESTHETIST, CERTIFIED REGISTERED

## 2017-11-14 PROCEDURE — 25010000002 DEXAMETHASONE PER 1 MG: Performed by: NURSE ANESTHETIST, CERTIFIED REGISTERED

## 2017-11-14 PROCEDURE — 94640 AIRWAY INHALATION TREATMENT: CPT

## 2017-11-14 PROCEDURE — A9270 NON-COVERED ITEM OR SERVICE: HCPCS | Performed by: SURGERY

## 2017-11-14 PROCEDURE — 49654 PR LAP, INCISIONAL HERNIA REPAIR,REDUCIBLE: CPT | Performed by: SURGERY

## 2017-11-14 PROCEDURE — 25010000002 HYDROMORPHONE PER 4 MG: Performed by: NURSE ANESTHETIST, CERTIFIED REGISTERED

## 2017-11-14 PROCEDURE — 25010000002 SUCCINYLCHOLINE PER 20 MG: Performed by: NURSE ANESTHETIST, CERTIFIED REGISTERED

## 2017-11-14 PROCEDURE — 25010000002 NEOSTIGMINE 10 MG/10ML SOLUTION

## 2017-11-14 PROCEDURE — 25010000002 PHENYLEPHRINE PER 1 ML: Performed by: NURSE ANESTHETIST, CERTIFIED REGISTERED

## 2017-11-14 PROCEDURE — 63710000001 FAMOTIDINE 20 MG TABLET: Performed by: SURGERY

## 2017-11-14 PROCEDURE — 25010000002 ONDANSETRON PER 1 MG: Performed by: SURGERY

## 2017-11-14 PROCEDURE — 63710000001 GUAIFENESIN 600 MG TABLET SUSTAINED-RELEASE 12 HOUR: Performed by: SURGERY

## 2017-11-14 PROCEDURE — 25010000002 ONDANSETRON PER 1 MG: Performed by: NURSE ANESTHETIST, CERTIFIED REGISTERED

## 2017-11-14 PROCEDURE — 94799 UNLISTED PULMONARY SVC/PX: CPT

## 2017-11-14 PROCEDURE — 25010000003 CEFAZOLIN PER 500 MG: Performed by: SURGERY

## 2017-11-14 PROCEDURE — 25010000002 FENTANYL CITRATE (PF) 100 MCG/2ML SOLUTION: Performed by: NURSE ANESTHETIST, CERTIFIED REGISTERED

## 2017-11-14 PROCEDURE — 99024 POSTOP FOLLOW-UP VISIT: CPT | Performed by: SURGERY

## 2017-11-14 PROCEDURE — 25010000002 PROPOFOL 10 MG/ML EMULSION: Performed by: NURSE ANESTHETIST, CERTIFIED REGISTERED

## 2017-11-14 PROCEDURE — 63710000001 ALBUTEROL PER 1 MG: Performed by: SURGERY

## 2017-11-14 PROCEDURE — 63710000001 ASPIRIN 81 MG TABLET DELAYED-RELEASE: Performed by: SURGERY

## 2017-11-14 PROCEDURE — C1781 MESH (IMPLANTABLE): HCPCS | Performed by: SURGERY

## 2017-11-14 PROCEDURE — 63710000001 BUDESONIDE-FORMOTEROL 160-4.5 MCG/ACT AEROSOL 6 G INHALER: Performed by: SURGERY

## 2017-11-14 PROCEDURE — 25010000002 HYDROMORPHONE PER 4 MG: Performed by: SURGERY

## 2017-11-14 PROCEDURE — 63710000001 BUMETANIDE 1 MG TABLET: Performed by: SURGERY

## 2017-11-14 PROCEDURE — 63710000001 ATORVASTATIN 10 MG TABLET: Performed by: SURGERY

## 2017-11-14 PROCEDURE — 63710000001 LISINOPRIL 2.5 MG TABLET: Performed by: SURGERY

## 2017-11-14 PROCEDURE — 63710000001 CARVEDILOL 12.5 MG TABLET: Performed by: SURGERY

## 2017-11-14 PROCEDURE — 63710000001 ISOSORBIDE MONONITRATE 30 MG TABLET SUSTAINED-RELEASE 24 HOUR: Performed by: SURGERY

## 2017-11-14 PROCEDURE — 94760 N-INVAS EAR/PLS OXIMETRY 1: CPT

## 2017-11-14 PROCEDURE — 63710000001 OXYCODONE-ACETAMINOPHEN 5-325 MG TABLET: Performed by: SURGERY

## 2017-11-14 PROCEDURE — 63710000001 PREGABALIN 75 MG CAPSULE: Performed by: SURGERY

## 2017-11-14 DEVICE — VENTRALIGHT ST MESH WITH ECHO PS POSITONING SYSTEM
Type: IMPLANTABLE DEVICE | Site: ABDOMEN | Status: FUNCTIONAL
Brand: VENTRALIGHT ST MESH WITH ECHO PS POSITONING SYSTEM

## 2017-11-14 RX ORDER — LIDOCAINE HYDROCHLORIDE 20 MG/ML
INJECTION, SOLUTION INFILTRATION; PERINEURAL AS NEEDED
Status: DISCONTINUED | OUTPATIENT
Start: 2017-11-14 | End: 2017-11-14 | Stop reason: SURG

## 2017-11-14 RX ORDER — ONDANSETRON 2 MG/ML
INJECTION INTRAMUSCULAR; INTRAVENOUS AS NEEDED
Status: DISCONTINUED | OUTPATIENT
Start: 2017-11-14 | End: 2017-11-14 | Stop reason: SURG

## 2017-11-14 RX ORDER — ONDANSETRON 2 MG/ML
4 INJECTION INTRAMUSCULAR; INTRAVENOUS ONCE AS NEEDED
Status: COMPLETED | OUTPATIENT
Start: 2017-11-14 | End: 2017-11-14

## 2017-11-14 RX ORDER — BUDESONIDE AND FORMOTEROL FUMARATE DIHYDRATE 160; 4.5 UG/1; UG/1
2 AEROSOL RESPIRATORY (INHALATION)
Status: DISCONTINUED | OUTPATIENT
Start: 2017-11-14 | End: 2017-11-15 | Stop reason: HOSPADM

## 2017-11-14 RX ORDER — SODIUM CHLORIDE, SODIUM LACTATE, POTASSIUM CHLORIDE, CALCIUM CHLORIDE 600; 310; 30; 20 MG/100ML; MG/100ML; MG/100ML; MG/100ML
100 INJECTION, SOLUTION INTRAVENOUS CONTINUOUS
Status: DISCONTINUED | OUTPATIENT
Start: 2017-11-14 | End: 2017-11-15 | Stop reason: HOSPADM

## 2017-11-14 RX ORDER — PREGABALIN 75 MG/1
150 CAPSULE ORAL 2 TIMES DAILY
Status: DISCONTINUED | OUTPATIENT
Start: 2017-11-14 | End: 2017-11-15 | Stop reason: HOSPADM

## 2017-11-14 RX ORDER — CYCLOBENZAPRINE HCL 10 MG
10 TABLET ORAL 3 TIMES DAILY PRN
Status: DISCONTINUED | OUTPATIENT
Start: 2017-11-14 | End: 2017-11-15 | Stop reason: HOSPADM

## 2017-11-14 RX ORDER — ISOSORBIDE MONONITRATE 30 MG/1
30 TABLET, EXTENDED RELEASE ORAL DAILY
Status: DISCONTINUED | OUTPATIENT
Start: 2017-11-14 | End: 2017-11-15 | Stop reason: HOSPADM

## 2017-11-14 RX ORDER — ASPIRIN 81 MG/1
81 TABLET ORAL DAILY
Status: DISCONTINUED | OUTPATIENT
Start: 2017-11-14 | End: 2017-11-15 | Stop reason: HOSPADM

## 2017-11-14 RX ORDER — DEXAMETHASONE SODIUM PHOSPHATE 4 MG/ML
INJECTION, SOLUTION INTRA-ARTICULAR; INTRALESIONAL; INTRAMUSCULAR; INTRAVENOUS; SOFT TISSUE AS NEEDED
Status: DISCONTINUED | OUTPATIENT
Start: 2017-11-14 | End: 2017-11-14 | Stop reason: SURG

## 2017-11-14 RX ORDER — NALOXONE HYDROCHLORIDE 0.4 MG/ML
0.1 INJECTION, SOLUTION INTRAMUSCULAR; INTRAVENOUS; SUBCUTANEOUS
Status: DISCONTINUED | OUTPATIENT
Start: 2017-11-14 | End: 2017-11-15 | Stop reason: HOSPADM

## 2017-11-14 RX ORDER — GLYCOPYRROLATE 0.2 MG/ML
INJECTION INTRAMUSCULAR; INTRAVENOUS AS NEEDED
Status: DISCONTINUED | OUTPATIENT
Start: 2017-11-14 | End: 2017-11-14 | Stop reason: SURG

## 2017-11-14 RX ORDER — NALOXONE HCL 0.4 MG/ML
0.2 VIAL (ML) INJECTION AS NEEDED
Status: DISCONTINUED | OUTPATIENT
Start: 2017-11-14 | End: 2017-11-14 | Stop reason: HOSPADM

## 2017-11-14 RX ORDER — FLUMAZENIL 0.1 MG/ML
0.2 INJECTION INTRAVENOUS AS NEEDED
Status: DISCONTINUED | OUTPATIENT
Start: 2017-11-14 | End: 2017-11-14 | Stop reason: HOSPADM

## 2017-11-14 RX ORDER — ALBUTEROL SULFATE 2.5 MG/3ML
2.5 SOLUTION RESPIRATORY (INHALATION)
Status: DISCONTINUED | OUTPATIENT
Start: 2017-11-14 | End: 2017-11-15 | Stop reason: HOSPADM

## 2017-11-14 RX ORDER — ACETAMINOPHEN 325 MG/1
650 TABLET ORAL ONCE AS NEEDED
Status: DISCONTINUED | OUTPATIENT
Start: 2017-11-14 | End: 2017-11-14 | Stop reason: HOSPADM

## 2017-11-14 RX ORDER — ATORVASTATIN CALCIUM 10 MG/1
10 TABLET, FILM COATED ORAL NIGHTLY
Status: DISCONTINUED | OUTPATIENT
Start: 2017-11-14 | End: 2017-11-15 | Stop reason: HOSPADM

## 2017-11-14 RX ORDER — MIDAZOLAM HYDROCHLORIDE 1 MG/ML
INJECTION INTRAMUSCULAR; INTRAVENOUS AS NEEDED
Status: DISCONTINUED | OUTPATIENT
Start: 2017-11-14 | End: 2017-11-14 | Stop reason: SURG

## 2017-11-14 RX ORDER — ONDANSETRON 4 MG/1
4 TABLET, ORALLY DISINTEGRATING ORAL EVERY 6 HOURS PRN
Status: DISCONTINUED | OUTPATIENT
Start: 2017-11-14 | End: 2017-11-15 | Stop reason: HOSPADM

## 2017-11-14 RX ORDER — LISINOPRIL 2.5 MG/1
2.5 TABLET ORAL 2 TIMES DAILY
Status: DISCONTINUED | OUTPATIENT
Start: 2017-11-14 | End: 2017-11-15 | Stop reason: HOSPADM

## 2017-11-14 RX ORDER — CARVEDILOL 12.5 MG/1
12.5 TABLET ORAL 2 TIMES DAILY
Status: DISCONTINUED | OUTPATIENT
Start: 2017-11-14 | End: 2017-11-15 | Stop reason: HOSPADM

## 2017-11-14 RX ORDER — NITROGLYCERIN 0.4 MG/1
0.4 TABLET SUBLINGUAL
Status: DISCONTINUED | OUTPATIENT
Start: 2017-11-14 | End: 2017-11-15 | Stop reason: HOSPADM

## 2017-11-14 RX ORDER — ALBUTEROL SULFATE 2.5 MG/3ML
SOLUTION RESPIRATORY (INHALATION) EVERY 4 HOURS PRN
Status: DISCONTINUED | OUTPATIENT
Start: 2017-11-14 | End: 2017-11-15 | Stop reason: HOSPADM

## 2017-11-14 RX ORDER — ROCURONIUM BROMIDE 10 MG/ML
INJECTION, SOLUTION INTRAVENOUS AS NEEDED
Status: DISCONTINUED | OUTPATIENT
Start: 2017-11-14 | End: 2017-11-14 | Stop reason: SURG

## 2017-11-14 RX ORDER — ALBUTEROL SULFATE 2.5 MG/3ML
2.5 SOLUTION RESPIRATORY (INHALATION) ONCE
Status: COMPLETED | OUTPATIENT
Start: 2017-11-14 | End: 2017-11-14

## 2017-11-14 RX ORDER — PROPOFOL 10 MG/ML
VIAL (ML) INTRAVENOUS AS NEEDED
Status: DISCONTINUED | OUTPATIENT
Start: 2017-11-14 | End: 2017-11-14 | Stop reason: SURG

## 2017-11-14 RX ORDER — ONDANSETRON 2 MG/ML
4 INJECTION INTRAMUSCULAR; INTRAVENOUS EVERY 6 HOURS PRN
Status: DISCONTINUED | OUTPATIENT
Start: 2017-11-14 | End: 2017-11-15 | Stop reason: HOSPADM

## 2017-11-14 RX ORDER — OXYCODONE HYDROCHLORIDE AND ACETAMINOPHEN 5; 325 MG/1; MG/1
1 TABLET ORAL EVERY 4 HOURS PRN
Status: DISCONTINUED | OUTPATIENT
Start: 2017-11-14 | End: 2017-11-15 | Stop reason: HOSPADM

## 2017-11-14 RX ORDER — FENTANYL CITRATE 50 UG/ML
INJECTION, SOLUTION INTRAMUSCULAR; INTRAVENOUS AS NEEDED
Status: DISCONTINUED | OUTPATIENT
Start: 2017-11-14 | End: 2017-11-14 | Stop reason: SURG

## 2017-11-14 RX ORDER — ONDANSETRON 4 MG/1
4 TABLET, FILM COATED ORAL EVERY 6 HOURS PRN
Status: DISCONTINUED | OUTPATIENT
Start: 2017-11-14 | End: 2017-11-15 | Stop reason: HOSPADM

## 2017-11-14 RX ORDER — LABETALOL HYDROCHLORIDE 5 MG/ML
5 INJECTION, SOLUTION INTRAVENOUS
Status: DISCONTINUED | OUTPATIENT
Start: 2017-11-14 | End: 2017-11-14 | Stop reason: HOSPADM

## 2017-11-14 RX ORDER — ACETAMINOPHEN 650 MG/1
650 SUPPOSITORY RECTAL ONCE AS NEEDED
Status: DISCONTINUED | OUTPATIENT
Start: 2017-11-14 | End: 2017-11-14 | Stop reason: HOSPADM

## 2017-11-14 RX ORDER — BUPIVACAINE HYDROCHLORIDE AND EPINEPHRINE 5; 5 MG/ML; UG/ML
INJECTION, SOLUTION EPIDURAL; INTRACAUDAL; PERINEURAL AS NEEDED
Status: DISCONTINUED | OUTPATIENT
Start: 2017-11-14 | End: 2017-11-15 | Stop reason: HOSPADM

## 2017-11-14 RX ORDER — SUCRALFATE 1 G/1
1 TABLET ORAL 2 TIMES DAILY
Status: DISCONTINUED | OUTPATIENT
Start: 2017-11-14 | End: 2017-11-15 | Stop reason: HOSPADM

## 2017-11-14 RX ORDER — SUCCINYLCHOLINE CHLORIDE 20 MG/ML
INJECTION INTRAMUSCULAR; INTRAVENOUS AS NEEDED
Status: DISCONTINUED | OUTPATIENT
Start: 2017-11-14 | End: 2017-11-14 | Stop reason: SURG

## 2017-11-14 RX ORDER — BUMETANIDE 1 MG/1
2 TABLET ORAL DAILY
Status: DISCONTINUED | OUTPATIENT
Start: 2017-11-14 | End: 2017-11-15 | Stop reason: HOSPADM

## 2017-11-14 RX ORDER — FAMOTIDINE 20 MG/1
20 TABLET, FILM COATED ORAL DAILY
Status: DISCONTINUED | OUTPATIENT
Start: 2017-11-14 | End: 2017-11-15 | Stop reason: HOSPADM

## 2017-11-14 RX ORDER — DIPHENHYDRAMINE HYDROCHLORIDE 50 MG/ML
12.5 INJECTION INTRAMUSCULAR; INTRAVENOUS
Status: DISCONTINUED | OUTPATIENT
Start: 2017-11-14 | End: 2017-11-14 | Stop reason: HOSPADM

## 2017-11-14 RX ORDER — EPHEDRINE SULFATE 50 MG/ML
5 INJECTION, SOLUTION INTRAVENOUS ONCE AS NEEDED
Status: DISCONTINUED | OUTPATIENT
Start: 2017-11-14 | End: 2017-11-14 | Stop reason: HOSPADM

## 2017-11-14 RX ORDER — GUAIFENESIN 600 MG/1
600 TABLET, EXTENDED RELEASE ORAL 2 TIMES DAILY
Status: DISCONTINUED | OUTPATIENT
Start: 2017-11-14 | End: 2017-11-15 | Stop reason: HOSPADM

## 2017-11-14 RX ORDER — NALOXONE HCL 0.4 MG/ML
0.1 VIAL (ML) INJECTION
Status: DISCONTINUED | OUTPATIENT
Start: 2017-11-14 | End: 2017-11-14

## 2017-11-14 RX ORDER — ACETAMINOPHEN 325 MG/1
650 TABLET ORAL ONCE
Status: DISCONTINUED | OUTPATIENT
Start: 2017-11-14 | End: 2017-11-14 | Stop reason: HOSPADM

## 2017-11-14 RX ADMIN — ONDANSETRON 4 MG: 2 INJECTION INTRAMUSCULAR; INTRAVENOUS at 11:25

## 2017-11-14 RX ADMIN — LISINOPRIL 2.5 MG: 2.5 TABLET ORAL at 14:47

## 2017-11-14 RX ADMIN — SODIUM CHLORIDE, POTASSIUM CHLORIDE, SODIUM LACTATE AND CALCIUM CHLORIDE: 600; 310; 30; 20 INJECTION, SOLUTION INTRAVENOUS at 09:51

## 2017-11-14 RX ADMIN — FAMOTIDINE 20 MG: 20 TABLET ORAL at 14:47

## 2017-11-14 RX ADMIN — FENTANYL CITRATE 50 MCG: 50 INJECTION, SOLUTION INTRAMUSCULAR; INTRAVENOUS at 10:53

## 2017-11-14 RX ADMIN — OXYCODONE HYDROCHLORIDE AND ACETAMINOPHEN 1 TABLET: 5; 325 TABLET ORAL at 21:15

## 2017-11-14 RX ADMIN — BUDESONIDE AND FORMOTEROL FUMARATE DIHYDRATE 2 PUFF: 160; 4.5 AEROSOL RESPIRATORY (INHALATION) at 16:42

## 2017-11-14 RX ADMIN — SUCCINYLCHOLINE CHLORIDE 140 MG: 20 INJECTION, SOLUTION INTRAMUSCULAR; INTRAVENOUS at 10:11

## 2017-11-14 RX ADMIN — ALBUTEROL SULFATE 2.5 MG: 2.5 SOLUTION RESPIRATORY (INHALATION) at 08:23

## 2017-11-14 RX ADMIN — OXYCODONE HYDROCHLORIDE AND ACETAMINOPHEN 1 TABLET: 5; 325 TABLET ORAL at 17:29

## 2017-11-14 RX ADMIN — WATER 2 G: 1 INJECTION INTRAMUSCULAR; INTRAVENOUS; SUBCUTANEOUS at 10:34

## 2017-11-14 RX ADMIN — GUAIFENESIN 600 MG: 600 TABLET, EXTENDED RELEASE ORAL at 14:46

## 2017-11-14 RX ADMIN — FENTANYL CITRATE 100 MCG: 50 INJECTION, SOLUTION INTRAMUSCULAR; INTRAVENOUS at 10:11

## 2017-11-14 RX ADMIN — MIDAZOLAM 1 MG: 1 INJECTION INTRAMUSCULAR; INTRAVENOUS at 10:03

## 2017-11-14 RX ADMIN — PREGABALIN 150 MG: 75 CAPSULE ORAL at 14:50

## 2017-11-14 RX ADMIN — GLYCOPYRROLATE 0.4 MG: 0.2 INJECTION, SOLUTION INTRAMUSCULAR; INTRAVENOUS at 11:54

## 2017-11-14 RX ADMIN — DEXAMETHASONE SODIUM PHOSPHATE 4 MG: 4 INJECTION, SOLUTION INTRAMUSCULAR; INTRAVENOUS at 11:25

## 2017-11-14 RX ADMIN — PREGABALIN 150 MG: 75 CAPSULE ORAL at 21:15

## 2017-11-14 RX ADMIN — ALBUTEROL SULFATE 2.5 MG: 2.5 SOLUTION RESPIRATORY (INHALATION) at 20:44

## 2017-11-14 RX ADMIN — ASPIRIN 81 MG: 81 TABLET, COATED ORAL at 14:47

## 2017-11-14 RX ADMIN — HYDROMORPHONE HYDROCHLORIDE 0.5 MG: 1 INJECTION, SOLUTION INTRAMUSCULAR; INTRAVENOUS; SUBCUTANEOUS at 12:55

## 2017-11-14 RX ADMIN — HYDROMORPHONE HYDROCHLORIDE 0.5 MG: 1 INJECTION, SOLUTION INTRAMUSCULAR; INTRAVENOUS; SUBCUTANEOUS at 12:35

## 2017-11-14 RX ADMIN — GUAIFENESIN 600 MG: 600 TABLET, EXTENDED RELEASE ORAL at 21:14

## 2017-11-14 RX ADMIN — CARVEDILOL 12.5 MG: 12.5 TABLET, FILM COATED ORAL at 21:11

## 2017-11-14 RX ADMIN — LISINOPRIL 2.5 MG: 2.5 TABLET ORAL at 21:14

## 2017-11-14 RX ADMIN — HYDROMORPHONE HYDROCHLORIDE 0.5 MG: 1 INJECTION, SOLUTION INTRAMUSCULAR; INTRAVENOUS; SUBCUTANEOUS at 12:45

## 2017-11-14 RX ADMIN — SUCRALFATE 1 G: 1 TABLET ORAL at 21:14

## 2017-11-14 RX ADMIN — SODIUM CHLORIDE, POTASSIUM CHLORIDE, SODIUM LACTATE AND CALCIUM CHLORIDE 100 ML/HR: 600; 310; 30; 20 INJECTION, SOLUTION INTRAVENOUS at 16:23

## 2017-11-14 RX ADMIN — HYDROMORPHONE HYDROCHLORIDE 1 MG: 1 INJECTION, SOLUTION INTRAMUSCULAR; INTRAVENOUS; SUBCUTANEOUS at 14:03

## 2017-11-14 RX ADMIN — SODIUM CHLORIDE, POTASSIUM CHLORIDE, SODIUM LACTATE AND CALCIUM CHLORIDE 100 ML/HR: 600; 310; 30; 20 INJECTION, SOLUTION INTRAVENOUS at 08:22

## 2017-11-14 RX ADMIN — HYDROMORPHONE HYDROCHLORIDE 0.5 MG: 1 INJECTION, SOLUTION INTRAMUSCULAR; INTRAVENOUS; SUBCUTANEOUS at 13:05

## 2017-11-14 RX ADMIN — CARVEDILOL 12.5 MG: 12.5 TABLET, FILM COATED ORAL at 14:47

## 2017-11-14 RX ADMIN — ALBUTEROL SULFATE 2.5 MG: 2.5 SOLUTION RESPIRATORY (INHALATION) at 16:33

## 2017-11-14 RX ADMIN — PHENYLEPHRINE HYDROCHLORIDE 100 MCG: 10 INJECTION INTRAVENOUS at 10:41

## 2017-11-14 RX ADMIN — BUMETANIDE 2 MG: 1 TABLET ORAL at 14:47

## 2017-11-14 RX ADMIN — SUCRALFATE 1 G: 1 TABLET ORAL at 14:47

## 2017-11-14 RX ADMIN — ROCURONIUM BROMIDE 10 MG: 10 INJECTION INTRAVENOUS at 11:20

## 2017-11-14 RX ADMIN — PROPOFOL 8 MG: 10 INJECTION, EMULSION INTRAVENOUS at 10:11

## 2017-11-14 RX ADMIN — ONDANSETRON 4 MG: 2 INJECTION INTRAMUSCULAR; INTRAVENOUS at 12:21

## 2017-11-14 RX ADMIN — ATORVASTATIN CALCIUM 10 MG: 10 TABLET, FILM COATED ORAL at 23:18

## 2017-11-14 RX ADMIN — ROCURONIUM BROMIDE 20 MG: 10 INJECTION INTRAVENOUS at 10:37

## 2017-11-14 RX ADMIN — ISOSORBIDE MONONITRATE 30 MG: 30 TABLET, EXTENDED RELEASE ORAL at 14:47

## 2017-11-14 RX ADMIN — ONDANSETRON 4 MG: 2 INJECTION INTRAMUSCULAR; INTRAVENOUS at 14:41

## 2017-11-14 RX ADMIN — LIDOCAINE HYDROCHLORIDE 30 MG: 20 INJECTION, SOLUTION INFILTRATION; PERINEURAL at 10:11

## 2017-11-14 NOTE — OP NOTE
VENTRAL HERNIA REPAIR LAPAROSCOPIC POSSIBLE OPEN  Procedure Note    Angelita Barraza Jr.  11/14/2017    Pre-op Diagnosis:   Incisional hernia, without obstruction or gangrene [K43.2]    Post-op Diagnosis:     Post-Op Diagnosis Codes:     * Incisional hernia, without obstruction or gangrene [K43.2]    Procedure(s):  LAPAROSCOPIC INCISIONAL HERNIA REPAIR WITH MESH (RIGHT ABDOMINAL PORT SITE HERNIA)    Surgeon(s):  Derik Michelle MD    Anesthesia: General    Staff:   Circulator: Kristina Watkins RN; Pily Love RN  Scrub Person: Darin Ritchie  Assistant: Phoebe Dennison CSA    Estimated Blood Loss: minimal    Specimens:                A: Hernia sac      Drains:           Findings: 3 cm RIGHT abdominal wall hernia in a port site from previous prostatectomy    Complications: None    Indications: This patient is 72 years old and had had a prostatectomy done for prostate cancer.  He had developed a port site hernia in his right mid abdomen that had become increasingly symptomatic.  He was taken to surgery today for laparoscopic repair.    Description of operation: The patient was brought to the operating room and placed supine on the operating table.  Adequate general endotracheal anesthesia was induced.  The abdominal area and flanks were prepped and draped in a sterile manner.  Briefing and timeout were performed and all parties were in agreement.      The patient had had a previous prostatectomy for prostate cancer.  This had been done robotically and he had developed a hernia on the right side of his abdomen at port site.      Incision was made at the tip of the 12th rib and a 5 mm blunt tipped trocar was placed atraumatically under direct vision with no visceral injury Gotti's point. The abdomen was insufflated to a total of 15 mmHg, 4.3 L of CO2.  A 5 mm 0° laparoscope was passed into the abdomen revealing an excellent view of the entire abdomen.  There were numerous dense adhesions in the  upper abdomen with the omentum stuck to the anterior abdominal wall.  2 5 mm trocars were placed in the left lower abdomen and periumbilical area under direct vision with no visceral injury.     Using these trocars, I was able to  bluntly dissected the adhesions away from the anterior abdominal wall.  The hernia was well visualized in the right mid abdomen and noted to be an approximately 3 cm defect. There were no intestinal contents within the hernia that was filled with fat.  The fat was dissected off the anterior abdominal wall with a harmonic scalpel.  A small incision was made over the hernia itself area in the subcutaneous tissue.  The hernia sac was dissected free all the way around using electrocautery until a good rim of fascia was achieved on all sides of the defect.  I then chose a 4.5 inch piece of Ventralex mesh that was prepared by placing an 0 Milan-Chetan suture at the 12:00, 3:00, 6:00, and 9:00 positions.  The mesh was then passed into the abdomen.    6 #1 PDS sutures were then used to close the fascial defect and the abdomen was reinsufflated.  Small incisions were then made around the defect, and the Milan sutures pulled tightly against the abdominal wall.  After desufflating the abdomen, the sutures were tied and the abdomen was reinsufflated.  The mesh lay flush against the anterior abdominal wall.  An absorbable tack was used to tack the mesh to the anterior abdominal wall.  4 #1 PDS sutures were then passed through the incision and through the mesh using a Milan suture passer to further secure the mesh to the anterior abdominal wall.  Each of these sutures was tied with the abdomen completely desufflated.  The abdomen was again insufflated and the mesh was well attached to the anterior abdominal wall.  All areas were checked for visceral injury and none was seen.  The abdomen was then desufflated a final time.  Trocars were removed.    The skin incisions were closed with continuous 4-0  subcuticular Vicryl incision.    Procedure was terminated.  She tolerated it well.  Sponge and needle counts were correct.  He was returned to recovery in satisfactory condition

## 2017-11-14 NOTE — PLAN OF CARE
Problem: Patient Care Overview (Adult)  Goal: Plan of Care Review  Outcome: Ongoing (interventions implemented as appropriate)    11/14/17 6337   Coping/Psychosocial Response Interventions   Plan Of Care Reviewed With patient   Patient Care Overview   Progress improving   Outcome Evaluation   Outcome Summary/Follow up Plan vss, abd incisions noted cdi, pain management begun no distress noted, tolerating ice chips well       Goal: Adult Individualization and Mutuality  Outcome: Ongoing (interventions implemented as appropriate)    Problem: Perioperative Period (Adult)  Goal: Signs and Symptoms of Listed Potential Problems Will be Absent or Manageable (Perioperative Period)  Outcome: Ongoing (interventions implemented as appropriate)

## 2017-11-14 NOTE — ANESTHESIA PROCEDURE NOTES
Airway  Urgency: elective    Airway not difficult    General Information and Staff    Patient location during procedure: OR    Indications and Patient Condition  Indications for airway management: airway protection    Preoxygenated: yes  MILS maintained throughout  Mask difficulty assessment: 1 - vent by mask    Final Airway Details  Final airway type: endotracheal airway      Successful airway: ETT  Cuffed: yes   Successful intubation technique: direct laryngoscopy  Endotracheal tube insertion site: oral  Blade: Tika  Blade size: #4  ETT size: 7.5 mm  Cormack-Lehane Classification: grade I - full view of glottis  Placement verified by: chest auscultation and capnometry   Measured from: lips  ETT to lips (cm): 22  Number of attempts at approach: 1

## 2017-11-14 NOTE — ANESTHESIA PREPROCEDURE EVALUATION
" Anesthesia Evaluation     NPO Solid Status: > 8 hours  NPO Liquid Status: > 8 hours     Airway   Mallampati: II  TM distance: >3 FB  Neck ROM: full  no difficulty expected  Dental    (+) poor dentition    Pulmonary    (+) a smoker Former, COPD moderate, asthma, shortness of breath, sleep apnea, rhonchi,   Cardiovascular     ECG reviewed  PT is on anticoagulation therapy  Patient on routine beta blocker  Rhythm: regular  Rate: normal    (+) hypertension well controlled, past MI  >12 months, CAD, PVD,     ROS comment: First degree heart block with PAC.    Neuro/Psych  (+) psychiatric history Anxiety,      ROS Comment: Spinal cord stimulator in place.  GI/Hepatic/Renal/Endo    (+)  GERD well controlled,     Musculoskeletal     (+) arthralgias,   Abdominal     Abdomen: soft.   Substance History      OB/GYN          Other   (+) arthritis   history of cancer                            Anesthesia Plan    ASA 3     general   (Gentle induction needed today; Patient self describes as a \"lightweight\".  Patient has been off plavix for four days.)  intravenous induction   Anesthetic plan and risks discussed with patient.      "

## 2017-11-14 NOTE — ANESTHESIA POSTPROCEDURE EVALUATION
Patient: Angelita Barraza Jr.    Procedure Summary     Date Anesthesia Start Anesthesia Stop Room / Location    11/14/17 1007 1215  [facility] / [facility]       Procedure Diagnosis Surgeon Provider    LAPAROSCOPIC POSSIBLE OPEN VENTRAL/INCISIONAL HERNIA REPAIR WITH MESH (N/A Abdomen) Incisional hernia, without obstruction or gangrene  (Incisional hernia, without obstruction or gangrene [K43.2]) MD Miguel Lindquist MD          Anesthesia Type: general  Last vitals  BP   179/86 (11/14/17 0813)   Temp   98.6 °F (37 °C) (11/14/17 0813)   Pulse   61 (11/14/17 0813)   Resp   18 (11/14/17 0813)     SpO2   94 % (11/14/17 0813)     Post Anesthesia Care and Evaluation    Patient location during evaluation: bedside  Patient participation: complete - patient participated  Level of consciousness: sleepy but conscious  Pain management: adequate  Airway patency: patent  Anesthetic complications: No anesthetic complications  PONV Status: none  Cardiovascular status: acceptable  Respiratory status: acceptable  Hydration status: acceptable

## 2017-11-14 NOTE — PROGRESS NOTES
"   LOS: 0 days   Patient Care Team:  López Alarcon MD as PCP - General (Family Medicine)    Chief Complaint: POD #0    Subjective     History of Present Illness    Subjective:  Symptoms:  Stable.  No shortness of breath or chest pain.    Diet:  Adequate intake.  No nausea or vomiting.    Activity level: Impaired due to weakness.    Pain:  He complains of pain that is moderate.  Pain is well controlled.        History taken from: patient chart    Objective     Vital Signs  Temp:  [96.9 °F (36.1 °C)-98.6 °F (37 °C)] 97.2 °F (36.2 °C)  Heart Rate:  [59-78] 78  Resp:  [14-20] 18  BP: (126-179)/(64-88) 152/88    Objective:  General Appearance:  Comfortable.    Vital signs: (most recent): Blood pressure 152/88, pulse 78, temperature 97.2 °F (36.2 °C), resp. rate 18, height 71\" (180.3 cm), weight 186 lb 8.2 oz (84.6 kg), SpO2 97 %.  Vital signs are normal.  No fever.    Output: Producing urine.    Abdomen: (Incisions are clean and dry. No cellulitis. No recurrence).               Results Review:     None    Medication Review: Done    Assessment/Plan     Active Problems:    Incisional hernia, without obstruction or gangrene      Assessment:    Condition: In stable condition.       Plan:   Encourage ambulation.  Clear liquid diet.  Resume home regimen.         Derik Michelle MD  11/14/17  4:17 PM    Time: 5 mins      "

## 2017-11-14 NOTE — NURSING NOTE
Patient stated had implant open and closes to go to bathroom. Ca;;ed Dr Damico office to find out what kind implant has artificial sphincter. Dr Damico recommended patient open before going to surgery then place jones if they need jones. Urine will not come out in less it is open. Reported to Tere in surgery what Dr Damico said.

## 2017-11-14 NOTE — H&P (VIEW-ONLY)
Chief Complaint   Patient presents with   • Follow-up     Recheck right groin pain.        HPI  RIGHT abdominal wall hernia at a port site for a prostatectomy. No hx of incarceration or intestinal obstruction. Hernia has increased in size since the last visit.  Past Medical History:   Diagnosis Date   • Abdominal pain     no hernias noted- multiple repairs      • Asthma    • Backache    • Cataract    • Constipation    • DJD (degenerative joint disease)     involving multiple joints   • Dyspnea     with exertion   • Emphysema of lung    • Essential hypertension    • Generalized anxiety disorder    • Hallux valgus     right 2nd toe   • Hearing loss    • Heartburn    • Hemorrhoids    • Incisional hernia with obstruction, without gangrene    • Malignant tumor of prostate    • Plantar fasciitis     right heel   • PVD (peripheral vascular disease)    • Sleep disorder    • Urinary incontinence        Past Surgical History:   Procedure Laterality Date   • ENDOSCOPY AND COLONOSCOPY  09/19/2011    Diverticulosis in sigmoid colon. Hemorrhoids in anus. Edema of rectal mucosa. Multiple biopsies taken. Lipoma in cecum. Multiple biopsies taken   • HERNIA REPAIR  08/17/2004    Left inguinal hernioplasty with mesh   • PROSTATECTOMY     • TRANSESOPHAGEAL ECHOCARDIOGRAM ([place])  07/09/2008    without color flow-Normal systolic function with evidence of left ventricular hypertrophy   • UMBILICAL HERNIA REPAIR  09/12/2006    Primary umbilical hernioplasty         Current Outpatient Prescriptions:   •  acetaminophen (TYLENOL) 650 MG 8 hr tablet, Take 650 mg by mouth., Disp: , Rfl:   •  albuterol (PROVENTIL HFA;VENTOLIN HFA) 108 (90 BASE) MCG/ACT inhaler, Inhale 2 puffs Every 4 (Four) Hours As Needed for wheezing., Disp: , Rfl:   •  albuterol (PROVENTIL) (2.5 MG/3ML) 0.083% nebulizer solution, Inhale 2.5 mg., Disp: , Rfl:   •  aluminum hydroxide-mag trisilicate (GAVISCON) 80-20 MG chewable tablet chewable tablet, Chew 1 tablet 3 (Three)  Times a Day., Disp: , Rfl:   •  aspirin 81 MG EC tablet, Take 81 mg by mouth Daily., Disp: , Rfl:   •  azithromycin (ZITHROMAX) 250 MG tablet, Take 1 tablet by mouth Every Other Day., Disp: , Rfl:   •  budesonide (PULMICORT) 0.5 MG/2ML nebulizer solution, Inhale 0.5 mg., Disp: , Rfl:   •  bumetanide (BUMEX) 2 MG tablet, , Disp: , Rfl: 3  •  carvedilol (COREG) 12.5 MG tablet, , Disp: , Rfl:   •  carvedilol (COREG) 3.125 MG tablet, Take 3.125 mg by mouth 2 (Two) Times a Day With Meals., Disp: , Rfl:   •  clonazePAM (KlonoPIN) 0.5 MG tablet, , Disp: , Rfl:   •  clopidogrel (PLAVIX) 75 MG tablet, Take 75 mg by mouth Daily., Disp: , Rfl:   •  COMBIVENT RESPIMAT  MCG/ACT inhaler, , Disp: , Rfl: 2  •  cyclobenzaprine (FLEXERIL) 10 MG tablet, Take 10 mg by mouth 3 (Three) Times a Day As Needed for muscle spasms., Disp: , Rfl:   •  DALIRESP 500 MCG tablet tablet, , Disp: , Rfl: 11  •  DULERA 200-5 MCG/ACT inhaler, , Disp: , Rfl: 11  •  famotidine (PEPCID) 20 MG tablet, Take 20 mg by mouth., Disp: , Rfl:   •  furosemide (LASIX) 40 MG tablet, Take 40 mg by mouth Daily., Disp: , Rfl:   •  ipratropium-albuterol (COMBIVENT)  MCG/ACT inhaler, Inhale 2 puffs Every 4 (Four) Hours As Needed for wheezing., Disp: , Rfl:   •  ipratropium-albuterol (DUO-NEB) 0.5-2.5 mg/mL nebulizer, , Disp: , Rfl: 6  •  isosorbide dinitrate (ISORDIL) 30 MG tablet, Take 30 mg by mouth Daily., Disp: , Rfl:   •  ketoconazole (NIZORAL) 2 % shampoo, Apply  topically Daily., Disp: , Rfl:   •  lisinopril (PRINIVIL,ZESTRIL) 2.5 MG tablet, Take 2.5 mg by mouth Daily., Disp: , Rfl:   •  Mometasone Furo-Formoterol Fum (DULERA IN), Inhale 2 puffs 2 (Two) Times a Day., Disp: , Rfl:   •  MUCINEX 600 MG 12 hr tablet, , Disp: , Rfl: 4  •  nitroglycerin (NITROSTAT) 0.4 MG SL tablet, Place 0.4 mg under the tongue Every 5 (Five) Minutes As Needed for chest pain. Take no more than 3 doses in 15 minutes., Disp: , Rfl:   •  O2 (OXYGEN), Inhale 2 L/min Daily.,  Disp: , Rfl:   •  potassium chloride (K-DUR) 10 MEQ CR tablet, , Disp: , Rfl:   •  pravastatin (PRAVACHOL) 40 MG tablet, Take 40 mg by mouth Every Night., Disp: , Rfl:   •  pregabalin (LYRICA) 150 MG capsule, Take 150 mg by mouth 2 (Two) Times a Day., Disp: , Rfl:   •  promethazine (PHENERGAN) 25 MG tablet, , Disp: , Rfl:   •  SPIRIVA RESPIMAT 2.5 MCG/ACT aerosol solution, , Disp: , Rfl: 11  •  sucralfate (CARAFATE) 1 G tablet, Take 1 g by mouth 3 (Three) Times a Day., Disp: , Rfl:   •  tapentadol (NUCYNTA) 75 MG tablet, Take 75 mg by mouth 4 (Four) Times a Day As Needed., Disp: , Rfl:     Allergies   Allergen Reactions   • Codeine Nausea And Vomiting   • Contrast Dye        Family History   Problem Relation Age of Onset   • Cancer Other    • Diabetes Other    • Heart disease Other    • Hypertension Other        Social History     Social History   • Marital status:      Spouse name: N/A   • Number of children: N/A   • Years of education: N/A     Occupational History   • Not on file.     Social History Main Topics   • Smoking status: Former Smoker   • Smokeless tobacco: Not on file   • Alcohol use No   • Drug use: Not on file   • Sexual activity: Not on file     Other Topics Concern   • Not on file     Social History Narrative       Review of Systems   Constitutional: Negative for activity change, appetite change, chills and fever.   HENT: Negative for hearing loss, nosebleeds and trouble swallowing.    Cardiovascular: Negative for chest pain, palpitations and leg swelling.   Gastrointestinal: Positive for abdominal pain. Negative for abdominal distention, anal bleeding, blood in stool, constipation, diarrhea, nausea, rectal pain and vomiting.   Endocrine: Negative for cold intolerance, heat intolerance, polydipsia and polyuria.   Genitourinary: Negative for decreased urine volume, difficulty urinating, dysuria, enuresis, frequency, hematuria and urgency.   Musculoskeletal: Positive for back pain. Negative for  arthralgias, gait problem, myalgias and neck pain.        Knee pain   Skin: Negative for pallor, rash and wound.   Allergic/Immunologic: Negative for immunocompromised state.   Neurological: Negative for dizziness, seizures, weakness, light-headedness, numbness and headaches.   Psychiatric/Behavioral: Negative for agitation and behavioral problems. The patient is not nervous/anxious.        Physical Exam   Constitutional: He appears well-developed and well-nourished.   HENT:   Head: Normocephalic and atraumatic.   Eyes: EOM are normal. Pupils are equal, round, and reactive to light.   Neck: Normal range of motion. Neck supple. No JVD present. No tracheal deviation present. No thyromegaly present.   Cardiovascular: Normal rate and regular rhythm.    Pulmonary/Chest: Effort normal and breath sounds normal. No stridor.   Abdominal: Soft. Bowel sounds are normal. He exhibits no distension and no mass. There is no tenderness. There is no rebound and no guarding. A hernia is present.       Lymphadenopathy:     He has no cervical adenopathy.     He has no axillary adenopathy.   Vitals reviewed.        ASSESSMENT    Angelita was seen today for follow-up.    Diagnoses and all orders for this visit:    Incisional hernia, without obstruction or gangrene  -     CBC & Differential; Future  -     Basic Metabolic Panel; Future  -     ECG 12 Lead; Future  -     lactated ringers infusion; Infuse 100 mL/hr into a venous catheter Continuous.  -     ceFAZolin (ANCEF) 2 g in sodium chloride 0.9 % 100 mL IVPB; Infuse 2 g into a venous catheter 1 (One) Time.  -     acetaminophen (TYLENOL) tablet 650 mg; Take 2 tablets by mouth 1 (One) Time.  -     Case Request; Standing  -     Case Request  -     Ambulatory Referral to Cardiology    Other orders  -     Follow Anesthesia Guidelines / Standing Orders; Future  -     Provide NPO Instructions to Patient  -     Follow Anesthesia Guidelines / Standing Orders; Standing  -     Verify NPO Status;  "Standing  -     Obtain Informed Consent; Standing  -     SCD (Sequential Compression Device) - Place on Patient in Pre-Op; Standing  -     Clip Operative Site; Standing        PLAN    1. Pre op cardiology evaluation- Dr. Lobo in [place]  2. Stop Plavix 5 days before surgery  2. Laparoscopic possible open repair of RIGHT abdominal wall hernia with mesh.    What are the indications that have led your doctor to the opinion that an operation is necessary?    A symptomatic, enlarging bulge is present for which operation has been suggested.    What, if any, alternative treatments are available for your condition?    Alternatives to surgery have been explained including watchful waiting, noting that patients who are asymptomatic or \"minimally symptomatic\" may be managed without surgical intervention. Will not solve the problem.    What will be the likely result if you don't have the operation?    Hernias may grow in size, or become tender, incarcerated, or cause intestinal obstruction. While the risk of these events is low, the risk with symptomatic hernias is higher.     What are the basic procedures involved in the operation?    A small incision or incisions are made and the defect is repaired and supported with permanent mesh.     What are the risks?    There are risks of bleeding, infection requiring antibiotics and possibly further surgery, injury to nearby structures, nerve injury, wound complications, recurrence of hernia. The risk of chronic pain may be as high as 10%, although the risk debilitating pain is approximately 2%. Urinary retention requiring catheterization may occur due to spasm of the bladder muscles in 1 in 100, and is more common in elderly males.  Events such as severe bleeding, the need for blood transfusion(s), heart irregularity or stoppage may occur, but are uncommon.  Bleeding is more common if  blood thinning drugs (such as Warfarin, Asprin, Clopidogrel or Dipyridamole)  are taken. Blood " clot in the leg (DVT) causing pain and swelling is possible. In rare cases part of the clot may break off and go to the lungs.   Any complication may require a prolonged hospitalization, a modified incision or additional surgery.    How is the operation expected to improve your health or quality of life?    Operations will decrease risks of serious events. It will relieve the discomfort of bulging.    Is hospitalization necessary and, if so, how long can you expect to be hospitalized?    The operation is usually performed on an outpatient basis under general anesthesia. Occasionally, overnight stay is necessary due to medical co-morbidities, the nature of the operation, or pain control.    What can you expect during your recovery period?    Incisional pain controlled is controlled with a combination of narcotic and non-narcotic oral pain medicines. The incisional areas may become swollen and bruised.       When can you expect to resume normal activities?    Activities (except lifting and straining) may be resumed within a few days. There is to be no lifting over 5 pounds for 6 weeks.    Are there likely to be residual effects from the operation?    Usually none, although pain and numbness are possible.     All questions were answered. The patient agrees to operation.            This document has been electronically signed by Derik Michelle MD on October 26, 2017 1:44 PM

## 2017-11-15 VITALS
OXYGEN SATURATION: 95 % | DIASTOLIC BLOOD PRESSURE: 82 MMHG | SYSTOLIC BLOOD PRESSURE: 160 MMHG | HEIGHT: 71 IN | WEIGHT: 186.51 LBS | RESPIRATION RATE: 18 BRPM | BODY MASS INDEX: 26.11 KG/M2 | HEART RATE: 60 BPM | TEMPERATURE: 96.7 F

## 2017-11-15 PROCEDURE — A9270 NON-COVERED ITEM OR SERVICE: HCPCS | Performed by: SURGERY

## 2017-11-15 PROCEDURE — 63710000001 GUAIFENESIN 600 MG TABLET SUSTAINED-RELEASE 12 HOUR: Performed by: SURGERY

## 2017-11-15 PROCEDURE — 63710000001 ISOSORBIDE MONONITRATE 30 MG TABLET SUSTAINED-RELEASE 24 HOUR: Performed by: SURGERY

## 2017-11-15 PROCEDURE — 94799 UNLISTED PULMONARY SVC/PX: CPT

## 2017-11-15 PROCEDURE — 63710000001 ALBUTEROL PER 1 MG: Performed by: SURGERY

## 2017-11-15 PROCEDURE — 63710000001 PREGABALIN 75 MG CAPSULE: Performed by: SURGERY

## 2017-11-15 PROCEDURE — 63710000001 LISINOPRIL 2.5 MG TABLET: Performed by: SURGERY

## 2017-11-15 PROCEDURE — 63710000001 BUMETANIDE 1 MG TABLET: Performed by: SURGERY

## 2017-11-15 PROCEDURE — 25010000002 ENOXAPARIN PER 10 MG: Performed by: SURGERY

## 2017-11-15 PROCEDURE — 63710000001 SUCRALFATE 1 G TABLET: Performed by: SURGERY

## 2017-11-15 PROCEDURE — 63710000001 ASPIRIN 81 MG TABLET DELAYED-RELEASE: Performed by: SURGERY

## 2017-11-15 PROCEDURE — 94760 N-INVAS EAR/PLS OXIMETRY 1: CPT

## 2017-11-15 PROCEDURE — 63710000001 OXYCODONE-ACETAMINOPHEN 5-325 MG TABLET: Performed by: SURGERY

## 2017-11-15 PROCEDURE — 63710000001 CARVEDILOL 12.5 MG TABLET: Performed by: SURGERY

## 2017-11-15 PROCEDURE — 63710000001 FAMOTIDINE 20 MG TABLET: Performed by: SURGERY

## 2017-11-15 RX ORDER — OXYCODONE HYDROCHLORIDE AND ACETAMINOPHEN 5; 325 MG/1; MG/1
1 TABLET ORAL EVERY 4 HOURS PRN
Qty: 20 TABLET | Refills: 0 | Status: SHIPPED | OUTPATIENT
Start: 2017-11-15 | End: 2018-01-08 | Stop reason: SDUPTHER

## 2017-11-15 RX ADMIN — OXYCODONE HYDROCHLORIDE AND ACETAMINOPHEN 1 TABLET: 5; 325 TABLET ORAL at 08:43

## 2017-11-15 RX ADMIN — GUAIFENESIN 600 MG: 600 TABLET, EXTENDED RELEASE ORAL at 08:37

## 2017-11-15 RX ADMIN — ALBUTEROL SULFATE 2.5 MG: 2.5 SOLUTION RESPIRATORY (INHALATION) at 06:50

## 2017-11-15 RX ADMIN — PREGABALIN 150 MG: 75 CAPSULE ORAL at 08:37

## 2017-11-15 RX ADMIN — ASPIRIN 81 MG: 81 TABLET, COATED ORAL at 08:37

## 2017-11-15 RX ADMIN — OXYCODONE HYDROCHLORIDE AND ACETAMINOPHEN 1 TABLET: 5; 325 TABLET ORAL at 01:07

## 2017-11-15 RX ADMIN — BUMETANIDE 2 MG: 1 TABLET ORAL at 08:37

## 2017-11-15 RX ADMIN — SODIUM CHLORIDE, POTASSIUM CHLORIDE, SODIUM LACTATE AND CALCIUM CHLORIDE 100 ML/HR: 600; 310; 30; 20 INJECTION, SOLUTION INTRAVENOUS at 02:28

## 2017-11-15 RX ADMIN — CARVEDILOL 12.5 MG: 12.5 TABLET, FILM COATED ORAL at 08:37

## 2017-11-15 RX ADMIN — FAMOTIDINE 20 MG: 20 TABLET ORAL at 08:37

## 2017-11-15 RX ADMIN — ALBUTEROL SULFATE: 2.5 SOLUTION RESPIRATORY (INHALATION) at 00:40

## 2017-11-15 RX ADMIN — SUCRALFATE 1 G: 1 TABLET ORAL at 08:37

## 2017-11-15 RX ADMIN — ISOSORBIDE MONONITRATE 30 MG: 30 TABLET, EXTENDED RELEASE ORAL at 08:37

## 2017-11-15 RX ADMIN — LISINOPRIL 2.5 MG: 2.5 TABLET ORAL at 08:37

## 2017-11-15 RX ADMIN — ENOXAPARIN SODIUM 40 MG: 40 INJECTION SUBCUTANEOUS at 08:37

## 2017-11-15 RX ADMIN — BUDESONIDE AND FORMOTEROL FUMARATE DIHYDRATE 2 PUFF: 160; 4.5 AEROSOL RESPIRATORY (INHALATION) at 06:53

## 2017-11-15 NOTE — DISCHARGE INSTRUCTIONS
Dr. Derik Michelle  [address] [place] [address]  [phone] (office)  [phone] (hospital)  [phone] (cell)    Discharge Instructions for Ventral Hernia Surgery      1. Go home, rest and take it easy today; however, you should get up and move about several times today to reduce the risk of developing a clot in your legs.    2. You may experience some dizziness or memory loss from the anesthesia.  This may last for the next 24 hours.  Someone should plan on staying with you for the first 24 hours for your safety.  3. Do not make any important legal decisions or sign any legal papers for the next 24 hours.    4. Eat and drink lightly today.  Start off with liquids, jello, soup, crackers or other bland foods at first. You may advance your diet tomorrow as tolerated as long as you do not experience any nausea or vomiting.   5. The white tapes called steri-strips should stay in place.  They will fall off on their own in 1-2 weeks.  Do not worry if they come off sooner.    6. You may notice some bleeding/drainage on your outer dressings. A little bloody drainage is normal. If the bleeding/drainage is such that the bandage cannot absorb it, remove the dressing, apply clean gauze and apply firm pressure for a full 15 minutes.  If the bleeding continues, please call me.  7. You may shower tomorrow.  No tub baths until your incisions are completely healed.  8. No lifting > 20 lbs. until you are seen at your follow-up visit.       9. You have received a prescription for a narcotic pain medicine, as you will have some pain following surgery. You will not be totally pain free, but your pain medicine should make the pain tolerable.  Please take your pain medicine as prescribed and always take your pills with food to prevent nausea. If you are having severe pain that cannot be controlled by the pain medicine, please contact me.    10. Nausea could be a result of the anesthesia or a result of the narcotic pain  medicine.  If you experience severe  nausea and vomiting that cannot       be controlled by the nausea medicine, please call me.    11. If you had a laparoscopic surgery, it is not unusual to experience pain/discomfort in your shoulders or under your ribs after surgery.  It is from     the gas used during the laparoscopic procedure and usually lasts 1-3 days. The prescription pain medicine is used to treat the surgical pain and does not typically alleviate this “gassy” pain.   12. No driving for 24 hours and for as long as you are taking your prescription pain medicine.  13. If an appointment is not given to you before discharge, you will need to call the office at [phone] to schedule a follow-up appointment in 5-7 days.   14. Ice packs may be used to alleviate discomfort.  15. Remember to contact me for any of the following:    • Fever > 101 degrees  • Severe pain that cannot be controlled by taking your pain pills  • Severe nausea or vomiting that cannot be controlled by taking your nausea pills  • Significant bleeding of your incisions  • Drainage that has a bad smell or is yellow or green in appearance  • Any other questions or concerns

## 2017-11-15 NOTE — DISCHARGE SUMMARY
Discharge Summary  Date: 11/15/17  Service: General Surgery  Attending: Derik Michelle MD    Procedures: Laparoscopic ventral hernia repair with mesh  Consults: None  Discharge Diagnoses: Ventral hernia   Secondary Diagnosis:   Abdominal pain     Asthma     Backache     Cataract     Constipation     DJD (degenerative joint disease)     Dyspnea     Emphysema of lung     Essential hypertension     Generalized anxiety disorder     Hallux valgus     Hearing loss     Heartburn     Hemorrhoids     Incisional hernia with obstruction, without gangrene     Malignant tumor of prostate     PVD (peripheral vascular disease)     Plantar fasciitis     Sleep disorder     Urinary incontinence     Coronary artery disease     MI (myocardial infarction) 2011    Anesthesia complication     Sleep apnea     MRSA infection       Reason for hospitalization:Postoperative pain control   Significant Findings: Hernia on the right side of the abdomen   Patient Condition on Discharge: Improved   Hospital Course: No postoperative complications.  Pain was under good control at the time of discharge and all wounds appear to be healing well.  The following discharge instructions were provided to the patient:      1. Go home, rest and take it easy today; however, you should get up and move about several times today to reduce the risk of developing a clot in your legs.    2. You may experience some dizziness or memory loss from the anesthesia.  This may last for the next 24 hours.  Someone should plan on staying with you for the first 24 hours for your safety.  3. Do not make any important legal decisions or sign any legal papers for the next 24 hours.    4. Eat and drink lightly today.  Start off with liquids, jello, soup, crackers or other bland foods at first. You may advance your diet tomorrow as tolerated as long as you do not experience any nausea or vomiting.   5. The white tapes called steri-strips should stay in place.  They will fall off on their  own in 1-2 weeks.  Do not worry if they come off sooner.    6. You may notice some bleeding/drainage on your outer dressings. A little bloody drainage is normal. If the bleeding/drainage is such that the bandage cannot absorb it, remove the dressing, apply clean gauze and apply firm pressure for a full 15 minutes.  If the bleeding continues, please call me.  7. You may shower tomorrow.  No tub baths until your incisions are completely healed.  8. No lifting > 20 lbs. until you are seen at your follow-up visit.       9. You have received a prescription for a narcotic pain medicine, as you will have some pain following surgery. You will not be totally pain free, but your pain medicine should make the pain tolerable.  Please take your pain medicine as prescribed and always take your pills with food to prevent nausea. If you are having severe pain that cannot be controlled by the pain medicine, please contact me.    10. Nausea could be a result of the anesthesia or a result of the narcotic pain medicine.  If you experience severe  nausea and vomiting that cannot       be controlled by the nausea medicine, please call me.    11. If you had a laparoscopic surgery, it is not unusual to experience pain/discomfort in your shoulders or under your ribs after surgery.  It is from     the gas used during the laparoscopic procedure and usually lasts 1-3 days. The prescription pain medicine is used to treat the surgical pain and does not typically alleviate this “gassy” pain.   12. No driving for 24 hours and for as long as you are taking your prescription pain medicine.  13. If an appointment is not given to you before discharge, you will need to call the office at [phone] to schedule a follow-up appointment in 5-7 days.   14. Ice packs may be used to alleviate discomfort.  15. Remember to contact me for any of the following:    • Fever > 101 degrees  • Severe pain that cannot be controlled by taking your pain  pills  • Severe nausea or vomiting that cannot be controlled by taking your nausea pills  • Significant bleeding of your incisions  • Drainage that has a bad smell or is yellow or green in appearance  • Any other questions or concerns                        Discharge Medications:     Your medication list      START taking these medications       Instructions Last Dose Given Next Dose Due    oxyCODONE-acetaminophen 5-325 MG per tablet   Commonly known as:  ROXICET        Take 1 tablet by mouth Every 4 (Four) Hours As Needed for Moderate Pain  or Severe Pain .           CONTINUE taking these medications       Instructions Last Dose Given Next Dose Due    acetaminophen 650 MG 8 hr tablet   Commonly known as:  TYLENOL              albuterol (2.5 MG/3ML) 0.083% nebulizer solution   Commonly known as:  PROVENTIL              albuterol 108 (90 Base) MCG/ACT inhaler   Commonly known as:  PROVENTIL HFA;VENTOLIN HFA              aluminum hydroxide-mag trisilicate 80-20 MG chewable tablet chewable tablet   Commonly known as:  GAVISCON              aspirin 81 MG EC tablet              bumetanide 2 MG tablet   Commonly known as:  BUMEX              carvedilol 12.5 MG tablet   Commonly known as:  COREG              clonazePAM 0.5 MG tablet   Commonly known as:  KlonoPIN              clopidogrel 75 MG tablet   Commonly known as:  PLAVIX              cyclobenzaprine 10 MG tablet   Commonly known as:  FLEXERIL              DALIRESP 500 MCG tablet tablet   Generic drug:  roflumilast              DULERA 200-5 MCG/ACT inhaler   Generic drug:  mometasone-formoterol              famotidine 20 MG tablet   Commonly known as:  PEPCID              isosorbide mononitrate 30 MG 24 hr tablet   Commonly known as:  IMDUR              ketoconazole 2 % shampoo   Commonly known as:  NIZORAL              lisinopril 2.5 MG tablet   Commonly known as:  PRINIVIL,ZESTRIL              LYRICA 150 MG capsule   Generic drug:  pregabalin              MUCINEX  600 MG 12 hr tablet   Generic drug:  guaiFENesin              MULTIVITAMIN ADULT PO              nitroglycerin 0.4 MG SL tablet   Commonly known as:  NITROSTAT              NUCYNTA 75 MG tablet   Generic drug:  tapentadol              O2   Commonly known as:  OXYGEN              potassium chloride 10 MEQ CR tablet   Commonly known as:  K-DUR              pravastatin 40 MG tablet   Commonly known as:  PRAVACHOL              promethazine 25 MG tablet   Commonly known as:  PHENERGAN              SPIRIVA RESPIMAT 2.5 MCG/ACT aerosol solution   Generic drug:  Tiotropium Bromide Monohydrate              sucralfate 1 g tablet   Commonly known as:  CARAFATE                   Where to Get Your Medications      You can get these medications from any pharmacy     Bring a paper prescription for each of these medications    • oxyCODONE-acetaminophen 5-325 MG per tablet                       This document has been electronically signed by Derik Michelle MD on November 15, 2017 8:11 AM

## 2017-11-15 NOTE — PLAN OF CARE
Problem: Patient Care Overview (Adult)  Goal: Plan of Care Review  Outcome: Ongoing (interventions implemented as appropriate)    11/15/17 0415   Coping/Psychosocial Response Interventions   Plan Of Care Reviewed With patient   Patient Care Overview   Progress improving   Outcome Evaluation   Outcome Summary/Follow up Plan VSS, pain is controlled, ambulating well, tolerating clear liquids       Goal: Adult Individualization and Mutuality  Outcome: Ongoing (interventions implemented as appropriate)  Goal: Discharge Needs Assessment  Outcome: Ongoing (interventions implemented as appropriate)    Problem: Pain, Acute (Adult)  Goal: Identify Related Risk Factors and Signs and Symptoms  Outcome: Ongoing (interventions implemented as appropriate)  Goal: Acceptable Pain Control/Comfort Level  Outcome: Ongoing (interventions implemented as appropriate)

## 2017-11-22 ENCOUNTER — OFFICE VISIT (OUTPATIENT)
Dept: SURGERY | Facility: CLINIC | Age: 72
End: 2017-11-22

## 2017-11-22 VITALS
WEIGHT: 184 LBS | SYSTOLIC BLOOD PRESSURE: 110 MMHG | BODY MASS INDEX: 25.76 KG/M2 | DIASTOLIC BLOOD PRESSURE: 60 MMHG | HEIGHT: 71 IN

## 2017-11-22 DIAGNOSIS — K43.2 INCISIONAL HERNIA, WITHOUT OBSTRUCTION OR GANGRENE: Primary | ICD-10-CM

## 2017-11-22 PROCEDURE — 99024 POSTOP FOLLOW-UP VISIT: CPT | Performed by: SURGERY

## 2017-11-22 RX ORDER — OXYCODONE HYDROCHLORIDE AND ACETAMINOPHEN 5; 325 MG/1; MG/1
1 TABLET ORAL EVERY 4 HOURS PRN
Qty: 20 TABLET | Refills: 0 | Status: SHIPPED | OUTPATIENT
Start: 2017-11-22 | End: 2018-01-08 | Stop reason: SDUPTHER

## 2017-11-22 NOTE — PROGRESS NOTES
CHIEF COMPLAINT:   Chief Complaint   Patient presents with   • Post-op Follow-up     Post operative laparoscopic ventral hernia 11-14-17.       HPI: This patient presents for a post-operative visit after undergoing a laparoscopic ventral/incisional hernia repair with mesh.  Patient reports no problems except incisional pain. Eating well without any significant nausea. Having good bowel function. No problems with constipation or diarrhea. No urinary complaints. Denies fever. Ambulating well and slowly returning to normal activities.    PATHOLOGY: None    PHYSICAL EXAM:    ABD: Incisions are healing well without any erythema or signs of infection. No hernia recurrence is noted.    ASSESSMENT:    Angelita was seen today for post-op follow-up.    Diagnoses and all orders for this visit:    Incisional hernia, without obstruction or gangrene    Other orders  -     oxyCODONE-acetaminophen (ROXICET) 5-325 MG per tablet; Take 1 tablet by mouth Every 4 (Four) Hours As Needed for Severe Pain  or Other (incisional pain after surgery).        PLAN:    1. The patient will follow-up 1 month unless any problems arise.  2. May shower.   3. May return to normal activity without restrictions 6 weeks after operation.  4. No further pain prescriptions after current          This document has been electronically signed by Derik Michelle MD on November 22, 2017 10:57 AM

## 2017-12-20 ENCOUNTER — OFFICE VISIT (OUTPATIENT)
Dept: SURGERY | Facility: CLINIC | Age: 72
End: 2017-12-20

## 2017-12-20 VITALS
HEIGHT: 71 IN | BODY MASS INDEX: 24.89 KG/M2 | WEIGHT: 177.8 LBS | SYSTOLIC BLOOD PRESSURE: 118 MMHG | DIASTOLIC BLOOD PRESSURE: 62 MMHG

## 2017-12-20 DIAGNOSIS — Z98.890 STATUS POST REPAIR OF VENTRAL HERNIA: Primary | ICD-10-CM

## 2017-12-20 DIAGNOSIS — Z87.19 STATUS POST REPAIR OF VENTRAL HERNIA: Primary | ICD-10-CM

## 2017-12-20 PROCEDURE — 99024 POSTOP FOLLOW-UP VISIT: CPT | Performed by: SURGERY

## 2017-12-20 RX ORDER — AZITHROMYCIN 250 MG/1
1 TABLET, FILM COATED ORAL EVERY OTHER DAY
Refills: 5 | COMMUNITY
Start: 2017-11-28 | End: 2018-07-11

## 2017-12-22 NOTE — PROGRESS NOTES
CHIEF COMPLAINT:   Chief Complaint   Patient presents with   • Follow-up     Recheck laparoscopic ventral hernia Repair with mesh 11-14-17.       HPI: This patient presents for a post-operative visit after undergoing a laparoscopic ventral/incisional hernia repair at a previous right abdominal wall trocar site..  Patient reports no problems. Eating well without any significant nausea. Having good bowel function. No problems with constipation or diarrhea. No urinary complaints. Denies fever. Ambulating well and slowly returning to normal activities.    PHYSICAL EXAM:    ABD: Incisions are healing well without any erythema or signs of infection. No hernia recurrence is noted.    ASSESSMENT:    Angelita was seen today for follow-up.    Diagnoses and all orders for this visit:    Status post repair of ventral hernia  Comments:  At the site of a right-sided abdominal wall trocar site for robotic prostatectomy      PLAN:    1. The patient will follow-up 1 month unless any problems arise.  2. May shower.   3. May return to normal activity without restrictions 6 weeks after operation.          This document has been electronically signed by Derik Michelle MD on December 21, 2017 7:36 PM

## 2018-01-08 ENCOUNTER — OFFICE VISIT (OUTPATIENT)
Dept: SURGERY | Facility: CLINIC | Age: 73
End: 2018-01-08

## 2018-01-08 VITALS
BODY MASS INDEX: 24.95 KG/M2 | SYSTOLIC BLOOD PRESSURE: 120 MMHG | DIASTOLIC BLOOD PRESSURE: 80 MMHG | WEIGHT: 178.2 LBS | HEIGHT: 71 IN

## 2018-01-08 DIAGNOSIS — K43.9 VENTRAL HERNIA WITHOUT OBSTRUCTION OR GANGRENE: Primary | ICD-10-CM

## 2018-01-08 PROCEDURE — 99024 POSTOP FOLLOW-UP VISIT: CPT | Performed by: SURGERY

## 2018-01-08 NOTE — PROGRESS NOTES
CHIEF COMPLAINT:   Chief Complaint   Patient presents with   • Follow-up     Recheck laparoscopic ventral hernia 11-14-17       HPI: This patient presents for a post-operative visit after undergoing a laparoscopic ventral/incisional hernia repair.  Patient reports no problems. Eating well without any significant nausea. Having good bowel function. No problems with constipation or diarrhea. No urinary complaints. Denies fever. Ambulating well and slowly returning to normal activities.    PHYSICAL EXAM:    ABD: Incisions are healing well without any erythema or signs of infection. No hernia recurrence is noted.    ASSESSMENT:    Angelita was seen today for follow-up.    Diagnoses and all orders for this visit:    Ventral hernia without obstruction or gangrene      PLAN:    1. The patient will follow-up 3 months unless any problems arise.  2. May shower.   3. May return to normal activity without restrictions 6 weeks after operation.          This document has been electronically signed by Derik Michelle MD on January 8, 2018 9:53 AM

## 2018-04-09 ENCOUNTER — OFFICE VISIT (OUTPATIENT)
Dept: SURGERY | Facility: CLINIC | Age: 73
End: 2018-04-09

## 2018-04-09 VITALS
HEIGHT: 71 IN | DIASTOLIC BLOOD PRESSURE: 60 MMHG | WEIGHT: 178.8 LBS | SYSTOLIC BLOOD PRESSURE: 110 MMHG | BODY MASS INDEX: 25.03 KG/M2

## 2018-04-09 DIAGNOSIS — Z98.890 HX OF VENTRAL HERNIA REPAIR: Primary | ICD-10-CM

## 2018-04-09 DIAGNOSIS — Z87.19 HX OF VENTRAL HERNIA REPAIR: Primary | ICD-10-CM

## 2018-04-09 PROCEDURE — 99024 POSTOP FOLLOW-UP VISIT: CPT | Performed by: SURGERY

## 2018-04-09 NOTE — PROGRESS NOTES
Chief Complaint   Patient presents with   • Follow-up     Recheck ventral hernia and check mass left hip.        HPI  Robotic port site incisional hernia repaired laparoscopically. Doing well- no complaints  Past Medical History:   Diagnosis Date   • Abdominal pain     no hernias noted- multiple repairs      • Anesthesia complication     states he , cardiac arrest for 2 min. with spinal cord stimulator surgery   • Asthma    • Backache    • Cataract    • Constipation    • Coronary artery disease    • DJD (degenerative joint disease)     involving multiple joints   • Dyspnea     with exertion   • Emphysema of lung    • Essential hypertension    • Generalized anxiety disorder    • Hallux valgus     right 2nd toe   • Hearing loss    • Heartburn    • Hemorrhoids    • Incisional hernia with obstruction, without gangrene    • Malignant tumor of prostate    • MI (myocardial infarction)    • MRSA infection    • Plantar fasciitis     right heel   • PVD (peripheral vascular disease)    • Sleep apnea     using c-pap   • Sleep disorder    • Urinary incontinence        Past Surgical History:   Procedure Laterality Date   • BLADDER SURGERY      AMS urinary control system   • CARDIAC SURGERY      cabg   • ENDOSCOPY AND COLONOSCOPY  2011    Diverticulosis in sigmoid colon. Hemorrhoids in anus. Edema of rectal mucosa. Multiple biopsies taken. Lipoma in cecum. Multiple biopsies taken   • HERNIA REPAIR  2004    Left inguinal hernioplasty with mesh   • PROSTATECTOMY     • SPINAL CORD STIMULATOR IMPLANT     • TRANSESOPHAGEAL ECHOCARDIOGRAM ([place])  2008    without color flow-Normal systolic function with evidence of left ventricular hypertrophy   • UMBILICAL HERNIA REPAIR  2006    Primary umbilical hernioplasty   • VENTRAL HERNIA REPAIR N/A 2017    Procedure: LAPAROSCOPIC POSSIBLE OPEN VENTRAL/INCISIONAL HERNIA REPAIR WITH MESH;  Surgeon: Derik Michelle MD;  Location: [facility];  Service:           Current Outpatient Prescriptions:   •  acetaminophen (TYLENOL) 650 MG 8 hr tablet, Take 650 mg by mouth Every 8 (Eight) Hours As Needed., Disp: , Rfl:   •  albuterol (PROVENTIL HFA;VENTOLIN HFA) 108 (90 BASE) MCG/ACT inhaler, Inhale 2 puffs Every 4 (Four) Hours As Needed for wheezing., Disp: , Rfl:   •  albuterol (PROVENTIL) (2.5 MG/3ML) 0.083% nebulizer solution, Inhale 2.5 mg 4 (Four) Times a Day., Disp: , Rfl:   •  aluminum hydroxide-mag trisilicate (GAVISCON) 80-20 MG chewable tablet chewable tablet, Chew 1 tablet As Needed., Disp: , Rfl:   •  aspirin 81 MG EC tablet, Take 81 mg by mouth Daily. Last dose 11/7/17, Disp: , Rfl:   •  azithromycin (ZITHROMAX) 250 MG tablet, Take 1 tablet by mouth Every Other Day., Disp: , Rfl: 5  •  bumetanide (BUMEX) 2 MG tablet, Take 2 mg by mouth Daily., Disp: , Rfl: 3  •  carvedilol (COREG) 12.5 MG tablet, Take 12.5 mg by mouth 2 (Two) Times a Day., Disp: , Rfl:   •  clopidogrel (PLAVIX) 75 MG tablet, Take 75 mg by mouth Daily. Last dose 11/7/17, Disp: , Rfl:   •  cyclobenzaprine (FLEXERIL) 10 MG tablet, Take 10 mg by mouth 3 (Three) Times a Day As Needed for muscle spasms., Disp: , Rfl:   •  DALIRESP 500 MCG tablet tablet, Take 500 mcg by mouth Daily., Disp: , Rfl: 11  •  DULERA 200-5 MCG/ACT inhaler, Inhale 2 puffs 2 (Two) Times a Day., Disp: , Rfl: 11  •  famotidine (PEPCID) 20 MG tablet, Take 20 mg by mouth Daily. Taking at lunch, Disp: , Rfl:   •  isosorbide mononitrate (IMDUR) 30 MG 24 hr tablet, Take 30 mg by mouth Daily., Disp: , Rfl:   •  ketoconazole (NIZORAL) 2 % shampoo, Apply  topically Daily., Disp: , Rfl:   •  lisinopril (PRINIVIL,ZESTRIL) 2.5 MG tablet, Take 2.5 mg by mouth 2 (Two) Times a Day., Disp: , Rfl:   •  MUCINEX 600 MG 12 hr tablet, Take 600 mg by mouth 2 (Two) Times a Day., Disp: , Rfl: 4  •  Multiple Vitamins-Minerals (MULTIVITAMIN ADULT PO), Take 1 tablet by mouth Daily., Disp: , Rfl:   •  nitroglycerin (NITROSTAT) 0.4 MG SL tablet, Place 0.4  mg under the tongue Every 5 (Five) Minutes As Needed for chest pain. Take no more than 3 doses in 15 minutes., Disp: , Rfl:   •  O2 (OXYGEN), Inhale 2 L/min Daily., Disp: , Rfl:   •  potassium chloride (K-DUR) 10 MEQ CR tablet, Take 10 mEq by mouth Daily. Takes at lunch, Disp: , Rfl:   •  pravastatin (PRAVACHOL) 40 MG tablet, Take 40 mg by mouth Every Night., Disp: , Rfl:   •  pregabalin (LYRICA) 150 MG capsule, Take 150 mg by mouth 2 (Two) Times a Day., Disp: , Rfl:   •  promethazine (PHENERGAN) 25 MG tablet, Take 25 mg by mouth Every 6 (Six) Hours As Needed., Disp: , Rfl:   •  SPIRIVA RESPIMAT 2.5 MCG/ACT aerosol solution, Inhale 1 puff 2 (Two) Times a Day., Disp: , Rfl: 11  •  sucralfate (CARAFATE) 1 G tablet, Take 1 g by mouth 2 (Two) Times a Day., Disp: , Rfl:   •  tapentadol (NUCYNTA) 75 MG tablet, Take 75 mg by mouth 4 (Four) Times a Day As Needed., Disp: , Rfl:   •  clonazePAM (KlonoPIN) 0.5 MG tablet, Take 0.25 mg by mouth At Night As Needed., Disp: , Rfl:     Allergies   Allergen Reactions   • Codeine Nausea And Vomiting   • Contrast Dye Rash       Family History   Problem Relation Age of Onset   • Cancer Other    • Diabetes Other    • Heart disease Other    • Hypertension Other        Social History     Social History   • Marital status:      Spouse name: N/A   • Number of children: N/A   • Years of education: N/A     Occupational History   • Not on file.     Social History Main Topics   • Smoking status: Former Smoker     Years: 25.00     Types: Cigarettes     Quit date: 2011   • Smokeless tobacco: Never Used   • Alcohol use No   • Drug use: No   • Sexual activity: Defer     Other Topics Concern   • Not on file     Social History Narrative   • No narrative on file       Review of Systems  Nothing to add  Physical Exam  All wounds are healed. No recurrence is noticed    ASSESSMENT    Angelita was seen today for follow-up.    Diagnoses and all orders for this visit:    Hx of ventral hernia  repair        PLAN    1. Recheck in 2 months          This document has been electronically signed by Derik Michelle MD on April 15, 2018 5:29 PM

## 2018-04-09 NOTE — PATIENT INSTRUCTIONS

## 2018-05-03 DIAGNOSIS — S30.22XA CONTUSION OF SCROTUM, INITIAL ENCOUNTER: Primary | ICD-10-CM

## 2018-05-03 RX ORDER — SULFAMETHOXAZOLE AND TRIMETHOPRIM 800; 160 MG/1; MG/1
1 TABLET ORAL 2 TIMES DAILY
Qty: 14 TABLET | Refills: 0 | Status: SHIPPED | OUTPATIENT
Start: 2018-05-03 | End: 2018-05-10

## 2018-07-09 ENCOUNTER — OFFICE VISIT (OUTPATIENT)
Dept: SURGERY | Facility: CLINIC | Age: 73
End: 2018-07-09

## 2018-07-09 VITALS
WEIGHT: 175.2 LBS | SYSTOLIC BLOOD PRESSURE: 160 MMHG | HEIGHT: 71 IN | DIASTOLIC BLOOD PRESSURE: 80 MMHG | BODY MASS INDEX: 24.53 KG/M2

## 2018-07-09 DIAGNOSIS — L02.212 CUTANEOUS ABSCESS OF BACK EXCLUDING BUTTOCKS: Primary | ICD-10-CM

## 2018-07-09 DIAGNOSIS — L02.212 CUTANEOUS ABSCESS OF BACK EXCLUDING BUTTOCKS: ICD-10-CM

## 2018-07-09 PROCEDURE — 99213 OFFICE O/P EST LOW 20 MIN: CPT | Performed by: SURGERY

## 2018-07-09 PROCEDURE — 87205 SMEAR GRAM STAIN: CPT | Performed by: SURGERY

## 2018-07-09 PROCEDURE — 76942 ECHO GUIDE FOR BIOPSY: CPT | Performed by: SURGERY

## 2018-07-09 PROCEDURE — 10022 PR FINE NEEDLE ASP;W/IMAGING GUIDANCE: CPT | Performed by: SURGERY

## 2018-07-09 PROCEDURE — 87070 CULTURE OTHR SPECIMN AEROBIC: CPT | Performed by: SURGERY

## 2018-07-09 RX ORDER — SULFAMETHOXAZOLE AND TRIMETHOPRIM 800; 160 MG/1; MG/1
1 TABLET ORAL 2 TIMES DAILY
Qty: 20 TABLET | Refills: 0 | Status: SHIPPED | OUTPATIENT
Start: 2018-07-09 | End: 2019-01-21

## 2018-07-09 NOTE — PATIENT INSTRUCTIONS

## 2018-07-11 ENCOUNTER — OFFICE VISIT (OUTPATIENT)
Dept: SURGERY | Facility: CLINIC | Age: 73
End: 2018-07-11

## 2018-07-11 ENCOUNTER — APPOINTMENT (OUTPATIENT)
Dept: PREADMISSION TESTING | Facility: HOSPITAL | Age: 73
End: 2018-07-11

## 2018-07-11 VITALS
OXYGEN SATURATION: 94 % | RESPIRATION RATE: 14 BRPM | SYSTOLIC BLOOD PRESSURE: 130 MMHG | HEART RATE: 60 BPM | DIASTOLIC BLOOD PRESSURE: 64 MMHG

## 2018-07-11 VITALS
BODY MASS INDEX: 24.42 KG/M2 | HEIGHT: 71 IN | SYSTOLIC BLOOD PRESSURE: 118 MMHG | WEIGHT: 174.4 LBS | DIASTOLIC BLOOD PRESSURE: 64 MMHG | TEMPERATURE: 98 F

## 2018-07-11 DIAGNOSIS — L98.9 DISORDER OF SKIN OR SUBCUTANEOUS TISSUE: Primary | ICD-10-CM

## 2018-07-11 LAB
ANION GAP SERPL CALCULATED.3IONS-SCNC: 9 MMOL/L (ref 5–15)
BUN BLD-MCNC: 20 MG/DL (ref 7–21)
BUN/CREAT SERPL: 23.5 (ref 7–25)
CALCIUM SPEC-SCNC: 9.5 MG/DL (ref 8.4–10.2)
CHLORIDE SERPL-SCNC: 103 MMOL/L (ref 95–110)
CO2 SERPL-SCNC: 29 MMOL/L (ref 22–31)
CREAT BLD-MCNC: 0.85 MG/DL (ref 0.7–1.3)
GFR SERPL CREATININE-BSD FRML MDRD: 89 ML/MIN/1.73 (ref 42–98)
GLUCOSE BLD-MCNC: 80 MG/DL (ref 60–100)
POTASSIUM BLD-SCNC: 4.2 MMOL/L (ref 3.5–5.1)
SODIUM BLD-SCNC: 141 MMOL/L (ref 137–145)

## 2018-07-11 PROCEDURE — 93005 ELECTROCARDIOGRAM TRACING: CPT

## 2018-07-11 PROCEDURE — 80048 BASIC METABOLIC PNL TOTAL CA: CPT | Performed by: ANESTHESIOLOGY

## 2018-07-11 PROCEDURE — 93010 ELECTROCARDIOGRAM REPORT: CPT | Performed by: INTERNAL MEDICINE

## 2018-07-11 PROCEDURE — 36415 COLL VENOUS BLD VENIPUNCTURE: CPT

## 2018-07-11 PROCEDURE — 99213 OFFICE O/P EST LOW 20 MIN: CPT | Performed by: SURGERY

## 2018-07-11 RX ORDER — SODIUM CHLORIDE, SODIUM GLUCONATE, SODIUM ACETATE, POTASSIUM CHLORIDE, AND MAGNESIUM CHLORIDE 526; 502; 368; 37; 30 MG/100ML; MG/100ML; MG/100ML; MG/100ML; MG/100ML
1000 INJECTION, SOLUTION INTRAVENOUS CONTINUOUS
Status: CANCELLED | OUTPATIENT
Start: 2018-07-12

## 2018-07-11 NOTE — PATIENT INSTRUCTIONS

## 2018-07-11 NOTE — PAT
Discussed cardiac hx and EKG with Dr. Juárez, no new orders received. Chlorhexidine cloth instructions given, patient voiced understanding.

## 2018-07-11 NOTE — DISCHARGE INSTRUCTIONS
[facility]  Pre-op Information and Guidelines    You will be called after 2 p.m. the day before your surgery (Friday for Monday surgery) and notified of your time for arrival and approximate surgery time.  If you have not received a call by 4P.M., please contact Same Day Surgery at [phone] of if outside [facility] call [phone].    Please Follow these Important Safety Guidelines:    • The morning of your procedure, take only the medications listed below with   A sip of water:____INHALERS, COREG, DALIRESP, PEPCID,_____________       ___IMDUR, FLEXERIL, MUCINEX_______________________    • DO NOT eat or drink anything after 12:00 midnight the night before surgery  Specific instructions concerning drinking clear liquids will be discussed during  the pre-surgery instruction call the day before your surgery.    • If you take a blood thinner (ex. Plavix, Coumadin, aspirin), ask your doctor when to stop it before surgery  STOP DATE: _________________    • Only 2 visitors are allowed in patient rooms at a time  Your visitors will be asked to wait in the lobby until the admission process is complete with the exception of a parent with a child and patients in need of special assistance.    • YOU CANNOT DRIVE YOURSELF HOME  You must be accompanied by someone who will be responsible for driving you home after surgery and for your care at home.    • DO NOT chew gum, use breath mints, hard candy, or smoke the day of surgery  • DO NOT drink alcohol for at least 24 hours before your surgery  • DO NOT wear any jewelry and remove all body piercing before coming to the hospital  • DO NOT wear make-up to the hospital  • If you are having surgery on an extremity (arm/leg/foot) remove nail polish/artificial nails on the surgical side  • Clothing, glasses, contacts, dentures, and hairpieces must be removed before surgery  • Bathe the night before or the morning of your surgery and do not use  powders/lotions on skin.

## 2018-07-12 ENCOUNTER — HOSPITAL ENCOUNTER (OUTPATIENT)
Facility: HOSPITAL | Age: 73
Setting detail: HOSPITAL OUTPATIENT SURGERY
Discharge: HOME | End: 2018-07-12
Attending: SURGERY | Admitting: SURGERY

## 2018-07-12 ENCOUNTER — ANESTHESIA (OUTPATIENT)
Dept: PERIOP | Facility: HOSPITAL | Age: 73
End: 2018-07-12

## 2018-07-12 ENCOUNTER — ANESTHESIA EVENT (OUTPATIENT)
Dept: PERIOP | Facility: HOSPITAL | Age: 73
End: 2018-07-12

## 2018-07-12 VITALS
HEIGHT: 71 IN | WEIGHT: 174.38 LBS | SYSTOLIC BLOOD PRESSURE: 185 MMHG | BODY MASS INDEX: 24.41 KG/M2 | TEMPERATURE: 97.5 F | HEART RATE: 61 BPM | OXYGEN SATURATION: 98 % | RESPIRATION RATE: 18 BRPM | DIASTOLIC BLOOD PRESSURE: 72 MMHG

## 2018-07-12 DIAGNOSIS — L98.9 DISORDER OF SKIN OR SUBCUTANEOUS TISSUE: ICD-10-CM

## 2018-07-12 PROCEDURE — 88304 TISSUE EXAM BY PATHOLOGIST: CPT | Performed by: SURGERY

## 2018-07-12 PROCEDURE — 88304 TISSUE EXAM BY PATHOLOGIST: CPT | Performed by: PATHOLOGY

## 2018-07-12 PROCEDURE — 27047 EXC HIP/PELVIS LES SC < 3 CM: CPT | Performed by: SURGERY

## 2018-07-12 PROCEDURE — 25010000002 PROPOFOL 10 MG/ML EMULSION: Performed by: NURSE ANESTHETIST, CERTIFIED REGISTERED

## 2018-07-12 PROCEDURE — 25010000002 FENTANYL CITRATE (PF) 100 MCG/2ML SOLUTION: Performed by: NURSE ANESTHETIST, CERTIFIED REGISTERED

## 2018-07-12 RX ORDER — LIDOCAINE HYDROCHLORIDE 10 MG/ML
INJECTION, SOLUTION INFILTRATION; PERINEURAL AS NEEDED
Status: DISCONTINUED | OUTPATIENT
Start: 2018-07-12 | End: 2018-07-12 | Stop reason: SURG

## 2018-07-12 RX ORDER — SODIUM CHLORIDE, SODIUM GLUCONATE, SODIUM ACETATE, POTASSIUM CHLORIDE, AND MAGNESIUM CHLORIDE 526; 502; 368; 37; 30 MG/100ML; MG/100ML; MG/100ML; MG/100ML; MG/100ML
1000 INJECTION, SOLUTION INTRAVENOUS CONTINUOUS
Status: DISCONTINUED | OUTPATIENT
Start: 2018-07-12 | End: 2018-07-12 | Stop reason: HOSPADM

## 2018-07-12 RX ORDER — PROPOFOL 10 MG/ML
VIAL (ML) INTRAVENOUS AS NEEDED
Status: DISCONTINUED | OUTPATIENT
Start: 2018-07-12 | End: 2018-07-12 | Stop reason: SURG

## 2018-07-12 RX ORDER — HYDROCODONE BITARTRATE AND ACETAMINOPHEN 7.5; 325 MG/1; MG/1
1 TABLET ORAL EVERY 4 HOURS PRN
Qty: 20 TABLET | Refills: 0 | Status: ON HOLD | OUTPATIENT
Start: 2018-07-12 | End: 2019-01-28

## 2018-07-12 RX ORDER — FENTANYL CITRATE 50 UG/ML
INJECTION, SOLUTION INTRAMUSCULAR; INTRAVENOUS AS NEEDED
Status: DISCONTINUED | OUTPATIENT
Start: 2018-07-12 | End: 2018-07-12 | Stop reason: SURG

## 2018-07-12 RX ADMIN — PROPOFOL 30 MG: 10 INJECTION, EMULSION INTRAVENOUS at 11:51

## 2018-07-12 RX ADMIN — PROPOFOL 30 MG: 10 INJECTION, EMULSION INTRAVENOUS at 11:35

## 2018-07-12 RX ADMIN — PROPOFOL 30 MG: 10 INJECTION, EMULSION INTRAVENOUS at 11:28

## 2018-07-12 RX ADMIN — PROPOFOL 20 MG: 10 INJECTION, EMULSION INTRAVENOUS at 11:48

## 2018-07-12 RX ADMIN — FENTANYL CITRATE 25 MCG: 50 INJECTION, SOLUTION INTRAMUSCULAR; INTRAVENOUS at 12:04

## 2018-07-12 RX ADMIN — PROPOFOL 50 MG: 10 INJECTION, EMULSION INTRAVENOUS at 11:06

## 2018-07-12 RX ADMIN — PROPOFOL 30 MG: 10 INJECTION, EMULSION INTRAVENOUS at 11:19

## 2018-07-12 RX ADMIN — PROPOFOL 30 MG: 10 INJECTION, EMULSION INTRAVENOUS at 11:45

## 2018-07-12 RX ADMIN — PROPOFOL 30 MG: 10 INJECTION, EMULSION INTRAVENOUS at 11:10

## 2018-07-12 RX ADMIN — PROPOFOL 20 MG: 10 INJECTION, EMULSION INTRAVENOUS at 11:13

## 2018-07-12 RX ADMIN — FENTANYL CITRATE 50 MCG: 50 INJECTION, SOLUTION INTRAMUSCULAR; INTRAVENOUS at 11:03

## 2018-07-12 RX ADMIN — LIDOCAINE HYDROCHLORIDE 50 MG: 10 INJECTION, SOLUTION INFILTRATION; PERINEURAL at 11:06

## 2018-07-12 RX ADMIN — PROPOFOL 30 MG: 10 INJECTION, EMULSION INTRAVENOUS at 11:42

## 2018-07-12 RX ADMIN — PROPOFOL 20 MG: 10 INJECTION, EMULSION INTRAVENOUS at 12:04

## 2018-07-12 RX ADMIN — PROPOFOL 20 MG: 10 INJECTION, EMULSION INTRAVENOUS at 11:56

## 2018-07-12 RX ADMIN — FENTANYL CITRATE 25 MCG: 50 INJECTION, SOLUTION INTRAMUSCULAR; INTRAVENOUS at 11:24

## 2018-07-12 RX ADMIN — PROPOFOL 30 MG: 10 INJECTION, EMULSION INTRAVENOUS at 11:32

## 2018-07-12 RX ADMIN — PROPOFOL 30 MG: 10 INJECTION, EMULSION INTRAVENOUS at 11:38

## 2018-07-12 RX ADMIN — SODIUM CHLORIDE, SODIUM GLUCONATE, SODIUM ACETATE, POTASSIUM CHLORIDE, AND MAGNESIUM CHLORIDE: 526; 502; 368; 37; 30 INJECTION, SOLUTION INTRAVENOUS at 09:49

## 2018-07-12 RX ADMIN — PROPOFOL 10 MG: 10 INJECTION, EMULSION INTRAVENOUS at 12:07

## 2018-07-12 RX ADMIN — PROPOFOL 40 MG: 10 INJECTION, EMULSION INTRAVENOUS at 11:23

## 2018-07-12 NOTE — H&P (VIEW-ONLY)
Chief Complaint   Patient presents with   • Follow-up     Recheck mass left buttock.        HPI  73-year-old man who has previously had a pain stimulator placed in his back.  He developed a fluid collection in this pocket after it was removed and this was aspirated on his last visit.  The fluid appeared purulent but did not grow any bacteria.  The fluid is reaccumulating and is now considered for excision of the mass or drainage as is appropriate.  He's had no fever, chills, night sweats, or weight loss.  The fluid makes it very difficult for him to sit comfortably.  Past Medical History:   Diagnosis Date   • Abdominal pain     no hernias noted- multiple repairs      • Anesthesia complication     states he , cardiac arrest for 2 min. with spinal cord stimulator surgery   • Asthma    • Backache    • Black lung disease (CMS/HCC)    • Cataract    • Constipation    • Coronary artery disease    • DJD (degenerative joint disease)     involving multiple joints   • Dyspnea     with exertion   • Emphysema of lung (CMS/HCC)    • Essential hypertension    • Generalized anxiety disorder    • Hallux valgus     right 2nd toe   • Hearing loss    • Heartburn    • Hemorrhoids    • Incisional hernia with obstruction, without gangrene    • Malignant tumor of prostate (CMS/HCC)    • MI (myocardial infarction)    • MRSA infection    • Plantar fasciitis     right heel   • PVD (peripheral vascular disease) (CMS/HCC)    • Sleep apnea     using c-pap   • Sleep disorder    • Urinary incontinence        Past Surgical History:   Procedure Laterality Date   • BLADDER SURGERY      AMS urinary control system   • CARDIAC SURGERY      cabg   • ENDOSCOPY AND COLONOSCOPY  2011    Diverticulosis in sigmoid colon. Hemorrhoids in anus. Edema of rectal mucosa. Multiple biopsies taken. Lipoma in cecum. Multiple biopsies taken   • HERNIA REPAIR  2004    Left inguinal hernioplasty with mesh   • PROSTATECTOMY     • SPINAL CORD  STIMULATOR IMPLANT     • TRANSESOPHAGEAL ECHOCARDIOGRAM ([place])  07/09/2008    without color flow-Normal systolic function with evidence of left ventricular hypertrophy   • UMBILICAL HERNIA REPAIR  09/12/2006    Primary umbilical hernioplasty   • VENTRAL HERNIA REPAIR N/A 11/14/2017    Procedure: LAPAROSCOPIC POSSIBLE OPEN VENTRAL/INCISIONAL HERNIA REPAIR WITH MESH;  Surgeon: Derik Michelle MD;  Location: [facility];  Service:          Current Outpatient Prescriptions:   •  acetaminophen (TYLENOL) 650 MG 8 hr tablet, Take 650 mg by mouth Every 8 (Eight) Hours As Needed., Disp: , Rfl:   •  albuterol (PROVENTIL HFA;VENTOLIN HFA) 108 (90 BASE) MCG/ACT inhaler, Inhale 2 puffs Every 4 (Four) Hours As Needed for wheezing., Disp: , Rfl:   •  albuterol (PROVENTIL) (2.5 MG/3ML) 0.083% nebulizer solution, Inhale 2.5 mg 4 (Four) Times a Day., Disp: , Rfl:   •  aluminum hydroxide-mag trisilicate (GAVISCON) 80-20 MG chewable tablet chewable tablet, Chew 1 tablet As Needed., Disp: , Rfl:   •  aspirin 81 MG EC tablet, Take 81 mg by mouth Daily. Last dose 11/7/17, Disp: , Rfl:   •  bumetanide (BUMEX) 2 MG tablet, Take 2 mg by mouth Daily., Disp: , Rfl: 3  •  carvedilol (COREG) 12.5 MG tablet, Take 12.5 mg by mouth 2 (Two) Times a Day., Disp: , Rfl:   •  clonazePAM (KlonoPIN) 0.5 MG tablet, Take 0.25 mg by mouth At Night As Needed., Disp: , Rfl:   •  clopidogrel (PLAVIX) 75 MG tablet, Take 75 mg by mouth Daily. Last dose 11/7/17, Disp: , Rfl:   •  cyclobenzaprine (FLEXERIL) 10 MG tablet, Take 10 mg by mouth 3 (Three) Times a Day As Needed for muscle spasms., Disp: , Rfl:   •  DALIRESP 500 MCG tablet tablet, Take 500 mcg by mouth Daily., Disp: , Rfl: 11  •  DULERA 200-5 MCG/ACT inhaler, Inhale 2 puffs 2 (Two) Times a Day., Disp: , Rfl: 11  •  famotidine (PEPCID) 20 MG tablet, Take 20 mg by mouth Daily. Taking at lunch, Disp: , Rfl:   •  isosorbide mononitrate (IMDUR) 30 MG 24 hr tablet, Take 30 mg by mouth Daily., Disp: , Rfl:   •   ketoconazole (NIZORAL) 2 % shampoo, Apply  topically Daily., Disp: , Rfl:   •  lisinopril (PRINIVIL,ZESTRIL) 2.5 MG tablet, Take 2.5 mg by mouth 2 (Two) Times a Day., Disp: , Rfl:   •  MUCINEX 600 MG 12 hr tablet, Take 600 mg by mouth 2 (Two) Times a Day., Disp: , Rfl: 4  •  Multiple Vitamins-Minerals (MULTIVITAMIN ADULT PO), Take 1 tablet by mouth Daily., Disp: , Rfl:   •  nitroglycerin (NITROSTAT) 0.4 MG SL tablet, Place 0.4 mg under the tongue Every 5 (Five) Minutes As Needed for chest pain. Take no more than 3 doses in 15 minutes., Disp: , Rfl:   •  O2 (OXYGEN), Inhale 2 L/min Daily., Disp: , Rfl:   •  potassium chloride (K-DUR) 10 MEQ CR tablet, Take 10 mEq by mouth Daily. Takes at lunch, Disp: , Rfl:   •  pravastatin (PRAVACHOL) 40 MG tablet, Take 40 mg by mouth Every Night., Disp: , Rfl:   •  pregabalin (LYRICA) 150 MG capsule, Take 150 mg by mouth 2 (Two) Times a Day., Disp: , Rfl:   •  promethazine (PHENERGAN) 25 MG tablet, Take 25 mg by mouth Every 6 (Six) Hours As Needed., Disp: , Rfl:   •  SPIRIVA RESPIMAT 2.5 MCG/ACT aerosol solution, Inhale 1 puff 2 (Two) Times a Day., Disp: , Rfl: 11  •  sucralfate (CARAFATE) 1 G tablet, Take 1 g by mouth 2 (Two) Times a Day., Disp: , Rfl:   •  sulfamethoxazole-trimethoprim (BACTRIM DS) 800-160 MG per tablet, Take 1 tablet by mouth 2 (Two) Times a Day., Disp: 20 tablet, Rfl: 0  •  tapentadol (NUCYNTA) 75 MG tablet, Take 75 mg by mouth 4 (Four) Times a Day As Needed., Disp: , Rfl:     Allergies   Allergen Reactions   • Codeine Nausea And Vomiting   • Contrast Dye Rash       Family History   Problem Relation Age of Onset   • Cancer Other    • Diabetes Other    • Heart disease Other    • Hypertension Other        Social History     Social History   • Marital status:      Spouse name: N/A   • Number of children: N/A   • Years of education: N/A     Occupational History   • Not on file.     Social History Main Topics   • Smoking status: Former Smoker     Years: 25.00      Types: Cigarettes     Quit date: 2011   • Smokeless tobacco: Never Used   • Alcohol use No   • Drug use: No   • Sexual activity: Defer     Other Topics Concern   • Not on file     Social History Narrative   • No narrative on file       Review of Systems   Musculoskeletal: Positive for arthralgias, back pain, gait problem, joint swelling, myalgias, neck pain and neck stiffness.   All other systems reviewed and are negative.      Physical Exam   Constitutional: He is oriented to person, place, and time. He appears well-developed and well-nourished. No distress.   HENT:   Head: Normocephalic and atraumatic.   Nose: Nose normal.   Mouth/Throat: No oropharyngeal exudate.   Eyes: Conjunctivae and EOM are normal. Pupils are equal, round, and reactive to light. Right eye exhibits no discharge. Left eye exhibits no discharge. No scleral icterus.   Neck: Trachea normal, normal range of motion and phonation normal. Neck supple. No JVD present. No tracheal deviation and no edema present. No thyromegaly present.   Cardiovascular: Normal rate, regular rhythm and normal heart sounds.  Exam reveals no gallop and no friction rub.    No murmur heard.  Pulmonary/Chest: Effort normal and breath sounds normal. No accessory muscle usage or stridor. No respiratory distress. He has no decreased breath sounds. He has no wheezes. He has no rales. He exhibits no tenderness.   Abdominal: Soft. He exhibits no distension, no fluid wave, no ascites, no pulsatile midline mass and no mass. There is no tenderness. There is no rebound and no guarding. No hernia.   Musculoskeletal: Normal range of motion. He exhibits no edema, tenderness or deformity.   Lymphadenopathy:     He has no cervical adenopathy.        Left: No supraclavicular adenopathy present.   Neurological: He is alert and oriented to person, place, and time. He has normal strength. No cranial nerve deficit.   Skin: Skin is warm and dry. No rash noted. He is not diaphoretic. No  erythema. No pallor.        Psychiatric: He has a normal mood and affect. His speech is normal and behavior is normal. Judgment and thought content normal. Cognition and memory are normal.   Vitals reviewed.        ASSESSMENT    Angelita was seen today for follow-up.    Diagnoses and all orders for this visit:    Disorder of skin or subcutaneous tissue  -     Case Request; Standing  -     Case Request    Other orders  -     Follow Anesthesia Guidelines / Standing Orders; Future  -     Follow Anesthesia Guidelines / Standing Orders; Standing  -     Place sequential compression device- to be placed on patient in Pre-op; Standing  -     Obtain informed consent; Standing        PLAN    1.  Excision of the mass, with drainage of the mass with drain placement is planned.    The procedures explained to the patient including the risks of bleeding, infection, open wounds, recurrence of fluid.  He understands and agrees.          This document has been electronically signed by Derik Michelle MD on July 11, 2018 9:15 PM

## 2018-07-12 NOTE — PROGRESS NOTES
Chief Complaint   Patient presents with   • Follow-up     Recheck mass left buttock.        HPI  73-year-old man who has previously had a pain stimulator placed in his back.  He developed a fluid collection in this pocket after it was removed and this was aspirated on his last visit.  The fluid appeared purulent but did not grow any bacteria.  The fluid is reaccumulating and is now considered for excision of the mass or drainage as is appropriate.  He's had no fever, chills, night sweats, or weight loss.  The fluid makes it very difficult for him to sit comfortably.  Past Medical History:   Diagnosis Date   • Abdominal pain     no hernias noted- multiple repairs      • Anesthesia complication     states he , cardiac arrest for 2 min. with spinal cord stimulator surgery   • Asthma    • Backache    • Black lung disease (CMS/HCC)    • Cataract    • Constipation    • Coronary artery disease    • DJD (degenerative joint disease)     involving multiple joints   • Dyspnea     with exertion   • Emphysema of lung (CMS/HCC)    • Essential hypertension    • Generalized anxiety disorder    • Hallux valgus     right 2nd toe   • Hearing loss    • Heartburn    • Hemorrhoids    • Incisional hernia with obstruction, without gangrene    • Malignant tumor of prostate (CMS/HCC)    • MI (myocardial infarction)    • MRSA infection    • Plantar fasciitis     right heel   • PVD (peripheral vascular disease) (CMS/HCC)    • Sleep apnea     using c-pap   • Sleep disorder    • Urinary incontinence        Past Surgical History:   Procedure Laterality Date   • BLADDER SURGERY      AMS urinary control system   • CARDIAC SURGERY      cabg   • ENDOSCOPY AND COLONOSCOPY  2011    Diverticulosis in sigmoid colon. Hemorrhoids in anus. Edema of rectal mucosa. Multiple biopsies taken. Lipoma in cecum. Multiple biopsies taken   • HERNIA REPAIR  2004    Left inguinal hernioplasty with mesh   • PROSTATECTOMY     • SPINAL CORD  STIMULATOR IMPLANT     • TRANSESOPHAGEAL ECHOCARDIOGRAM ([place])  07/09/2008    without color flow-Normal systolic function with evidence of left ventricular hypertrophy   • UMBILICAL HERNIA REPAIR  09/12/2006    Primary umbilical hernioplasty   • VENTRAL HERNIA REPAIR N/A 11/14/2017    Procedure: LAPAROSCOPIC POSSIBLE OPEN VENTRAL/INCISIONAL HERNIA REPAIR WITH MESH;  Surgeon: Derik Michelle MD;  Location: [facility];  Service:          Current Outpatient Prescriptions:   •  acetaminophen (TYLENOL) 650 MG 8 hr tablet, Take 650 mg by mouth Every 8 (Eight) Hours As Needed., Disp: , Rfl:   •  albuterol (PROVENTIL HFA;VENTOLIN HFA) 108 (90 BASE) MCG/ACT inhaler, Inhale 2 puffs Every 4 (Four) Hours As Needed for wheezing., Disp: , Rfl:   •  albuterol (PROVENTIL) (2.5 MG/3ML) 0.083% nebulizer solution, Inhale 2.5 mg 4 (Four) Times a Day., Disp: , Rfl:   •  aluminum hydroxide-mag trisilicate (GAVISCON) 80-20 MG chewable tablet chewable tablet, Chew 1 tablet As Needed., Disp: , Rfl:   •  aspirin 81 MG EC tablet, Take 81 mg by mouth Daily. Last dose 11/7/17, Disp: , Rfl:   •  bumetanide (BUMEX) 2 MG tablet, Take 2 mg by mouth Daily., Disp: , Rfl: 3  •  carvedilol (COREG) 12.5 MG tablet, Take 12.5 mg by mouth 2 (Two) Times a Day., Disp: , Rfl:   •  clonazePAM (KlonoPIN) 0.5 MG tablet, Take 0.25 mg by mouth At Night As Needed., Disp: , Rfl:   •  clopidogrel (PLAVIX) 75 MG tablet, Take 75 mg by mouth Daily. Last dose 11/7/17, Disp: , Rfl:   •  cyclobenzaprine (FLEXERIL) 10 MG tablet, Take 10 mg by mouth 3 (Three) Times a Day As Needed for muscle spasms., Disp: , Rfl:   •  DALIRESP 500 MCG tablet tablet, Take 500 mcg by mouth Daily., Disp: , Rfl: 11  •  DULERA 200-5 MCG/ACT inhaler, Inhale 2 puffs 2 (Two) Times a Day., Disp: , Rfl: 11  •  famotidine (PEPCID) 20 MG tablet, Take 20 mg by mouth Daily. Taking at lunch, Disp: , Rfl:   •  isosorbide mononitrate (IMDUR) 30 MG 24 hr tablet, Take 30 mg by mouth Daily., Disp: , Rfl:   •   ketoconazole (NIZORAL) 2 % shampoo, Apply  topically Daily., Disp: , Rfl:   •  lisinopril (PRINIVIL,ZESTRIL) 2.5 MG tablet, Take 2.5 mg by mouth 2 (Two) Times a Day., Disp: , Rfl:   •  MUCINEX 600 MG 12 hr tablet, Take 600 mg by mouth 2 (Two) Times a Day., Disp: , Rfl: 4  •  Multiple Vitamins-Minerals (MULTIVITAMIN ADULT PO), Take 1 tablet by mouth Daily., Disp: , Rfl:   •  nitroglycerin (NITROSTAT) 0.4 MG SL tablet, Place 0.4 mg under the tongue Every 5 (Five) Minutes As Needed for chest pain. Take no more than 3 doses in 15 minutes., Disp: , Rfl:   •  O2 (OXYGEN), Inhale 2 L/min Daily., Disp: , Rfl:   •  potassium chloride (K-DUR) 10 MEQ CR tablet, Take 10 mEq by mouth Daily. Takes at lunch, Disp: , Rfl:   •  pravastatin (PRAVACHOL) 40 MG tablet, Take 40 mg by mouth Every Night., Disp: , Rfl:   •  pregabalin (LYRICA) 150 MG capsule, Take 150 mg by mouth 2 (Two) Times a Day., Disp: , Rfl:   •  promethazine (PHENERGAN) 25 MG tablet, Take 25 mg by mouth Every 6 (Six) Hours As Needed., Disp: , Rfl:   •  SPIRIVA RESPIMAT 2.5 MCG/ACT aerosol solution, Inhale 1 puff 2 (Two) Times a Day., Disp: , Rfl: 11  •  sucralfate (CARAFATE) 1 G tablet, Take 1 g by mouth 2 (Two) Times a Day., Disp: , Rfl:   •  sulfamethoxazole-trimethoprim (BACTRIM DS) 800-160 MG per tablet, Take 1 tablet by mouth 2 (Two) Times a Day., Disp: 20 tablet, Rfl: 0  •  tapentadol (NUCYNTA) 75 MG tablet, Take 75 mg by mouth 4 (Four) Times a Day As Needed., Disp: , Rfl:     Allergies   Allergen Reactions   • Codeine Nausea And Vomiting   • Contrast Dye Rash       Family History   Problem Relation Age of Onset   • Cancer Other    • Diabetes Other    • Heart disease Other    • Hypertension Other        Social History     Social History   • Marital status:      Spouse name: N/A   • Number of children: N/A   • Years of education: N/A     Occupational History   • Not on file.     Social History Main Topics   • Smoking status: Former Smoker     Years: 25.00      Types: Cigarettes     Quit date: 2011   • Smokeless tobacco: Never Used   • Alcohol use No   • Drug use: No   • Sexual activity: Defer     Other Topics Concern   • Not on file     Social History Narrative   • No narrative on file       Review of Systems   Musculoskeletal: Positive for arthralgias, back pain, gait problem, joint swelling, myalgias, neck pain and neck stiffness.   All other systems reviewed and are negative.      Physical Exam   Constitutional: He is oriented to person, place, and time. He appears well-developed and well-nourished. No distress.   HENT:   Head: Normocephalic and atraumatic.   Nose: Nose normal.   Mouth/Throat: No oropharyngeal exudate.   Eyes: Conjunctivae and EOM are normal. Pupils are equal, round, and reactive to light. Right eye exhibits no discharge. Left eye exhibits no discharge. No scleral icterus.   Neck: Trachea normal, normal range of motion and phonation normal. Neck supple. No JVD present. No tracheal deviation and no edema present. No thyromegaly present.   Cardiovascular: Normal rate, regular rhythm and normal heart sounds.  Exam reveals no gallop and no friction rub.    No murmur heard.  Pulmonary/Chest: Effort normal and breath sounds normal. No accessory muscle usage or stridor. No respiratory distress. He has no decreased breath sounds. He has no wheezes. He has no rales. He exhibits no tenderness.   Abdominal: Soft. He exhibits no distension, no fluid wave, no ascites, no pulsatile midline mass and no mass. There is no tenderness. There is no rebound and no guarding. No hernia.   Musculoskeletal: Normal range of motion. He exhibits no edema, tenderness or deformity.   Lymphadenopathy:     He has no cervical adenopathy.        Left: No supraclavicular adenopathy present.   Neurological: He is alert and oriented to person, place, and time. He has normal strength. No cranial nerve deficit.   Skin: Skin is warm and dry. No rash noted. He is not diaphoretic. No  erythema. No pallor.        Psychiatric: He has a normal mood and affect. His speech is normal and behavior is normal. Judgment and thought content normal. Cognition and memory are normal.   Vitals reviewed.        ASSESSMENT    Angelita was seen today for follow-up.    Diagnoses and all orders for this visit:    Disorder of skin or subcutaneous tissue  -     Case Request; Standing  -     Case Request    Other orders  -     Follow Anesthesia Guidelines / Standing Orders; Future  -     Follow Anesthesia Guidelines / Standing Orders; Standing  -     Place sequential compression device- to be placed on patient in Pre-op; Standing  -     Obtain informed consent; Standing        PLAN    1.  Excision of the mass, with drainage of the mass with drain placement is planned.    The procedures explained to the patient including the risks of bleeding, infection, open wounds, recurrence of fluid.  He understands and agrees.          This document has been electronically signed by Derik Michelle MD on July 11, 2018 9:15 PM

## 2018-07-12 NOTE — OP NOTE
TRUNK LESION/CYST EXCISION  Procedure Note    Angelita Barraza Jr.  7/12/2018    Pre-op Diagnosis:   Disorder of skin or subcutaneous tissue [L98.9]    Post-op Diagnosis:     Post-Op Diagnosis Codes:     * Disorder of skin or subcutaneous tissue [L98.9]    Procedure/CPT® Codes:      Procedure(s):  EXCISION MASS LEFT HIP    Surgeon(s):  Derik Michelle MD    Anesthesia: Monitor Anesthesia Care    Staff:   Circulator: Alexandra Calhoun RN; Sherita Gomez, SANTOS; Sherita Jeff RN  Scrub Person: Dary Sinha  Assistant: Phoebe Dennison CSA    Estimated Blood Loss: minimal    Specimens:                ID Type Source Tests Collected by Time   A : CYSTIC MASS LEFT HIP Tissue Hip, Left TISSUE PATHOLOGY EXAM Derik Michelle MD 7/12/2018 1115         Drains:   Closed/Suction Drain 1 Left Hip Bulb 15 Fr. (Active)   Dressing Status Clean;Dry;Intact;Other (Comment) 7/12/2018 12:05 PM       Findings: Thick-walled cystic mass with necrotic material within    Complications: None    Indications: This gentleman had a large cystic mass in the the left hip site of a previous spine stimulator.  Stimulator had been placed to have an spell and had become infected.  It been removed and moved to a different site.  The cystic massive been aspirated in the office only to recur.  This was causing him increasing pain, we elected to excise it.    Description of procedure.  Patient is brought to the operating room and placed supine on the operating table.  After adequate intravenous sedation is placed with the left side up right side down and the left hip was prepped and draped in a sterile manner.  An incision is made over the cystic mass and carried into the subcutaneous tissue it is excised nearly in its entirety except for the very superior aspect which tracked superiorly and could not be easily retrieved.  The mass was excised with electrocautery and included fluid as well as necrotic tissue.  Bleeding was well-controlled with electrocautery and  the wound was closed over a 15 round Terrence-Ventura drain with interrupted 3-0 Vicryl steep interrupted 4-0 nylon's on skin.  The drain was secured with 2-0 nylon.    The procedures terminated.  Tolerated well.  Sponge and needle counts correct.  Returned recovery in satisfactory condition.            This document has been electronically signed by Derik Michelle MD on July 12, 2018 12:21 PM      Date: 7/12/2018  Time: 12:21 PM

## 2018-07-12 NOTE — PROGRESS NOTES
Chief Complaint   Patient presents with   • Follow-up     Recheck ventral hernia.        HPI  Mr. Barraza is 73 years old and is seen today for a painful mass in the left gluteal area above the buttocks and low back.  This place was the site of previous rater for pain control.  Following removal of the generator, he developed fluid that has become increasingly uncomfortable for him to sit on.  He said no fever or chills and no drainage from this area.  Past Medical History:   Diagnosis Date   • Abdominal pain     no hernias noted- multiple repairs      • Anesthesia complication     states he , cardiac arrest for 2 min. with spinal cord stimulator surgery   • Asthma    • Backache    • Black lung disease (CMS/HCC)    • Cataract    • Constipation    • Coronary artery disease    • DJD (degenerative joint disease)     involving multiple joints   • Dyspnea     with exertion   • Emphysema of lung (CMS/HCC)    • Essential hypertension    • Generalized anxiety disorder    • Hallux valgus     right 2nd toe   • Hearing loss    • Heartburn    • Hemorrhoids    • Incisional hernia with obstruction, without gangrene    • Malignant tumor of prostate (CMS/HCC)    • MI (myocardial infarction)    • MRSA infection    • Plantar fasciitis     right heel   • PVD (peripheral vascular disease) (CMS/HCC)    • Sleep apnea     using c-pap   • Sleep disorder    • Urinary incontinence        Past Surgical History:   Procedure Laterality Date   • BLADDER SURGERY      AMS urinary control system   • CARDIAC SURGERY      cabg   • ENDOSCOPY AND COLONOSCOPY  2011    Diverticulosis in sigmoid colon. Hemorrhoids in anus. Edema of rectal mucosa. Multiple biopsies taken. Lipoma in cecum. Multiple biopsies taken   • HERNIA REPAIR  2004    Left inguinal hernioplasty with mesh   • PROSTATECTOMY     • SPINAL CORD STIMULATOR IMPLANT     • TRANSESOPHAGEAL ECHOCARDIOGRAM ([place])  2008    without color flow-Normal systolic function  with evidence of left ventricular hypertrophy   • UMBILICAL HERNIA REPAIR  09/12/2006    Primary umbilical hernioplasty   • VENTRAL HERNIA REPAIR N/A 11/14/2017    Procedure: LAPAROSCOPIC POSSIBLE OPEN VENTRAL/INCISIONAL HERNIA REPAIR WITH MESH;  Surgeon: Derik Michelle MD;  Location: [facility];  Service:          Current Outpatient Prescriptions:   •  acetaminophen (TYLENOL) 650 MG 8 hr tablet, Take 650 mg by mouth Every 8 (Eight) Hours As Needed., Disp: , Rfl:   •  albuterol (PROVENTIL HFA;VENTOLIN HFA) 108 (90 BASE) MCG/ACT inhaler, Inhale 2 puffs Every 4 (Four) Hours As Needed for wheezing., Disp: , Rfl:   •  albuterol (PROVENTIL) (2.5 MG/3ML) 0.083% nebulizer solution, Inhale 2.5 mg 4 (Four) Times a Day., Disp: , Rfl:   •  aluminum hydroxide-mag trisilicate (GAVISCON) 80-20 MG chewable tablet chewable tablet, Chew 1 tablet As Needed., Disp: , Rfl:   •  aspirin 81 MG EC tablet, Take 81 mg by mouth Daily. Last dose 11/7/17, Disp: , Rfl:   •  bumetanide (BUMEX) 2 MG tablet, Take 2 mg by mouth Daily., Disp: , Rfl: 3  •  carvedilol (COREG) 12.5 MG tablet, Take 12.5 mg by mouth 2 (Two) Times a Day., Disp: , Rfl:   •  clonazePAM (KlonoPIN) 0.5 MG tablet, Take 0.25 mg by mouth At Night As Needed., Disp: , Rfl:   •  clopidogrel (PLAVIX) 75 MG tablet, Take 75 mg by mouth Daily. Last dose 11/7/17, Disp: , Rfl:   •  cyclobenzaprine (FLEXERIL) 10 MG tablet, Take 10 mg by mouth 3 (Three) Times a Day As Needed for muscle spasms., Disp: , Rfl:   •  DALIRESP 500 MCG tablet tablet, Take 500 mcg by mouth Daily., Disp: , Rfl: 11  •  DULERA 200-5 MCG/ACT inhaler, Inhale 2 puffs 2 (Two) Times a Day., Disp: , Rfl: 11  •  famotidine (PEPCID) 20 MG tablet, Take 20 mg by mouth Daily. Taking at lunch, Disp: , Rfl:   •  isosorbide mononitrate (IMDUR) 30 MG 24 hr tablet, Take 30 mg by mouth Daily., Disp: , Rfl:   •  ketoconazole (NIZORAL) 2 % shampoo, Apply  topically Daily., Disp: , Rfl:   •  lisinopril (PRINIVIL,ZESTRIL) 2.5 MG tablet,  Take 2.5 mg by mouth 2 (Two) Times a Day., Disp: , Rfl:   •  MUCINEX 600 MG 12 hr tablet, Take 600 mg by mouth 2 (Two) Times a Day., Disp: , Rfl: 4  •  Multiple Vitamins-Minerals (MULTIVITAMIN ADULT PO), Take 1 tablet by mouth Daily., Disp: , Rfl:   •  nitroglycerin (NITROSTAT) 0.4 MG SL tablet, Place 0.4 mg under the tongue Every 5 (Five) Minutes As Needed for chest pain. Take no more than 3 doses in 15 minutes., Disp: , Rfl:   •  O2 (OXYGEN), Inhale 2 L/min Daily., Disp: , Rfl:   •  potassium chloride (K-DUR) 10 MEQ CR tablet, Take 10 mEq by mouth Daily. Takes at lunch, Disp: , Rfl:   •  pravastatin (PRAVACHOL) 40 MG tablet, Take 40 mg by mouth Every Night., Disp: , Rfl:   •  pregabalin (LYRICA) 150 MG capsule, Take 150 mg by mouth 2 (Two) Times a Day., Disp: , Rfl:   •  promethazine (PHENERGAN) 25 MG tablet, Take 25 mg by mouth Every 6 (Six) Hours As Needed., Disp: , Rfl:   •  SPIRIVA RESPIMAT 2.5 MCG/ACT aerosol solution, Inhale 1 puff 2 (Two) Times a Day., Disp: , Rfl: 11  •  sucralfate (CARAFATE) 1 G tablet, Take 1 g by mouth 2 (Two) Times a Day., Disp: , Rfl:   •  tapentadol (NUCYNTA) 75 MG tablet, Take 75 mg by mouth 4 (Four) Times a Day As Needed., Disp: , Rfl:   •  sulfamethoxazole-trimethoprim (BACTRIM DS) 800-160 MG per tablet, Take 1 tablet by mouth 2 (Two) Times a Day., Disp: 20 tablet, Rfl: 0    Allergies   Allergen Reactions   • Codeine Nausea And Vomiting   • Contrast Dye Rash       Family History   Problem Relation Age of Onset   • Cancer Other    • Diabetes Other    • Heart disease Other    • Hypertension Other        Social History     Social History   • Marital status:      Spouse name: N/A   • Number of children: N/A   • Years of education: N/A     Occupational History   • Not on file.     Social History Main Topics   • Smoking status: Former Smoker     Years: 25.00     Types: Cigarettes     Quit date: 2011   • Smokeless tobacco: Never Used   • Alcohol use No   • Drug use: No   • Sexual  activity: Defer     Other Topics Concern   • Not on file     Social History Narrative   • No narrative on file       Review of Systems   Musculoskeletal: Positive for arthralgias, back pain, gait problem, joint swelling, myalgias, neck pain and neck stiffness.   Skin: Negative for color change, pallor, rash and wound.   All other systems reviewed and are negative.      Physical Exam   Skin: Skin is warm and dry. No rash noted. No erythema. No pallor.            Suture: Under local anesthesia of 1% Xylocaine with epinephrine an adequate prep with alcohol, the area is pierced under ultrasound guidance and aspirated.  20 cc of purulent material is removed.  This fluid is sent for Gram stain culture and sensitivity.  The mass is aspirated completely dry.  No bleeding is encountered.  The procedures terminated.    ASSESSMENT    Angelita was seen today for follow-up.    Diagnoses and all orders for this visit:    Cutaneous abscess of back excluding buttocks  -     Wound Culture - Wound, Buttock, Left    Other orders  -     sulfamethoxazole-trimethoprim (BACTRIM DS) 800-160 MG per tablet; Take 1 tablet by mouth 2 (Two) Times a Day.        PLAN    1.  Recheck in 2 days          This document has been electronically signed by Derik Michelle MD on July 11, 2018 9:21 PM

## 2018-07-12 NOTE — ANESTHESIA POSTPROCEDURE EVALUATION
Patient: Angelita Barraza Jr.    Procedure Summary     Date:  07/12/18 Room / Location:  [facility] OR 03 / [facility] OR    Anesthesia Start:  1053 Anesthesia Stop:  1218    Procedure:  EXCISION MASS LEFT HIP (Left Abdomen) Diagnosis:       Disorder of skin or subcutaneous tissue      (Disorder of skin or subcutaneous tissue [L98.9])    Surgeon:  Derik Michelle MD Provider:  Sukh Sharma MD    Anesthesia Type:  MAC ASA Status:  4          Anesthesia Type: MAC  Last vitals  BP   167/76 (07/12/18 1014)   Temp   98.1 °F (36.7 °C) (07/12/18 1014)   Pulse   55 (07/12/18 1014)   Resp   18 (07/12/18 1014)     SpO2   95 % (07/12/18 1014)     Post Anesthesia Care and Evaluation    Patient location during evaluation: PHASE II  Patient participation: complete - patient participated  Level of consciousness: awake and alert  Pain management: adequate  Airway patency: patent  Anesthetic complications: No anesthetic complications  PONV Status: none  Cardiovascular status: acceptable  Respiratory status: acceptable  Hydration status: acceptable

## 2018-07-12 NOTE — H&P (VIEW-ONLY)
Chief Complaint   Patient presents with   • Follow-up     Recheck ventral hernia.        HPI  Mr. Barraza is 73 years old and is seen today for a painful mass in the left gluteal area above the buttocks and low back.  This place was the site of previous rater for pain control.  Following removal of the generator, he developed fluid that has become increasingly uncomfortable for him to sit on.  He said no fever or chills and no drainage from this area.  Past Medical History:   Diagnosis Date   • Abdominal pain     no hernias noted- multiple repairs      • Anesthesia complication     states he , cardiac arrest for 2 min. with spinal cord stimulator surgery   • Asthma    • Backache    • Black lung disease (CMS/HCC)    • Cataract    • Constipation    • Coronary artery disease    • DJD (degenerative joint disease)     involving multiple joints   • Dyspnea     with exertion   • Emphysema of lung (CMS/HCC)    • Essential hypertension    • Generalized anxiety disorder    • Hallux valgus     right 2nd toe   • Hearing loss    • Heartburn    • Hemorrhoids    • Incisional hernia with obstruction, without gangrene    • Malignant tumor of prostate (CMS/HCC)    • MI (myocardial infarction)    • MRSA infection    • Plantar fasciitis     right heel   • PVD (peripheral vascular disease) (CMS/HCC)    • Sleep apnea     using c-pap   • Sleep disorder    • Urinary incontinence        Past Surgical History:   Procedure Laterality Date   • BLADDER SURGERY      AMS urinary control system   • CARDIAC SURGERY      cabg   • ENDOSCOPY AND COLONOSCOPY  2011    Diverticulosis in sigmoid colon. Hemorrhoids in anus. Edema of rectal mucosa. Multiple biopsies taken. Lipoma in cecum. Multiple biopsies taken   • HERNIA REPAIR  2004    Left inguinal hernioplasty with mesh   • PROSTATECTOMY     • SPINAL CORD STIMULATOR IMPLANT     • TRANSESOPHAGEAL ECHOCARDIOGRAM ([place])  2008    without color flow-Normal systolic function  with evidence of left ventricular hypertrophy   • UMBILICAL HERNIA REPAIR  09/12/2006    Primary umbilical hernioplasty   • VENTRAL HERNIA REPAIR N/A 11/14/2017    Procedure: LAPAROSCOPIC POSSIBLE OPEN VENTRAL/INCISIONAL HERNIA REPAIR WITH MESH;  Surgeon: Derik Michelle MD;  Location: [facility];  Service:          Current Outpatient Prescriptions:   •  acetaminophen (TYLENOL) 650 MG 8 hr tablet, Take 650 mg by mouth Every 8 (Eight) Hours As Needed., Disp: , Rfl:   •  albuterol (PROVENTIL HFA;VENTOLIN HFA) 108 (90 BASE) MCG/ACT inhaler, Inhale 2 puffs Every 4 (Four) Hours As Needed for wheezing., Disp: , Rfl:   •  albuterol (PROVENTIL) (2.5 MG/3ML) 0.083% nebulizer solution, Inhale 2.5 mg 4 (Four) Times a Day., Disp: , Rfl:   •  aluminum hydroxide-mag trisilicate (GAVISCON) 80-20 MG chewable tablet chewable tablet, Chew 1 tablet As Needed., Disp: , Rfl:   •  aspirin 81 MG EC tablet, Take 81 mg by mouth Daily. Last dose 11/7/17, Disp: , Rfl:   •  bumetanide (BUMEX) 2 MG tablet, Take 2 mg by mouth Daily., Disp: , Rfl: 3  •  carvedilol (COREG) 12.5 MG tablet, Take 12.5 mg by mouth 2 (Two) Times a Day., Disp: , Rfl:   •  clonazePAM (KlonoPIN) 0.5 MG tablet, Take 0.25 mg by mouth At Night As Needed., Disp: , Rfl:   •  clopidogrel (PLAVIX) 75 MG tablet, Take 75 mg by mouth Daily. Last dose 11/7/17, Disp: , Rfl:   •  cyclobenzaprine (FLEXERIL) 10 MG tablet, Take 10 mg by mouth 3 (Three) Times a Day As Needed for muscle spasms., Disp: , Rfl:   •  DALIRESP 500 MCG tablet tablet, Take 500 mcg by mouth Daily., Disp: , Rfl: 11  •  DULERA 200-5 MCG/ACT inhaler, Inhale 2 puffs 2 (Two) Times a Day., Disp: , Rfl: 11  •  famotidine (PEPCID) 20 MG tablet, Take 20 mg by mouth Daily. Taking at lunch, Disp: , Rfl:   •  isosorbide mononitrate (IMDUR) 30 MG 24 hr tablet, Take 30 mg by mouth Daily., Disp: , Rfl:   •  ketoconazole (NIZORAL) 2 % shampoo, Apply  topically Daily., Disp: , Rfl:   •  lisinopril (PRINIVIL,ZESTRIL) 2.5 MG tablet,  Take 2.5 mg by mouth 2 (Two) Times a Day., Disp: , Rfl:   •  MUCINEX 600 MG 12 hr tablet, Take 600 mg by mouth 2 (Two) Times a Day., Disp: , Rfl: 4  •  Multiple Vitamins-Minerals (MULTIVITAMIN ADULT PO), Take 1 tablet by mouth Daily., Disp: , Rfl:   •  nitroglycerin (NITROSTAT) 0.4 MG SL tablet, Place 0.4 mg under the tongue Every 5 (Five) Minutes As Needed for chest pain. Take no more than 3 doses in 15 minutes., Disp: , Rfl:   •  O2 (OXYGEN), Inhale 2 L/min Daily., Disp: , Rfl:   •  potassium chloride (K-DUR) 10 MEQ CR tablet, Take 10 mEq by mouth Daily. Takes at lunch, Disp: , Rfl:   •  pravastatin (PRAVACHOL) 40 MG tablet, Take 40 mg by mouth Every Night., Disp: , Rfl:   •  pregabalin (LYRICA) 150 MG capsule, Take 150 mg by mouth 2 (Two) Times a Day., Disp: , Rfl:   •  promethazine (PHENERGAN) 25 MG tablet, Take 25 mg by mouth Every 6 (Six) Hours As Needed., Disp: , Rfl:   •  SPIRIVA RESPIMAT 2.5 MCG/ACT aerosol solution, Inhale 1 puff 2 (Two) Times a Day., Disp: , Rfl: 11  •  sucralfate (CARAFATE) 1 G tablet, Take 1 g by mouth 2 (Two) Times a Day., Disp: , Rfl:   •  tapentadol (NUCYNTA) 75 MG tablet, Take 75 mg by mouth 4 (Four) Times a Day As Needed., Disp: , Rfl:   •  sulfamethoxazole-trimethoprim (BACTRIM DS) 800-160 MG per tablet, Take 1 tablet by mouth 2 (Two) Times a Day., Disp: 20 tablet, Rfl: 0    Allergies   Allergen Reactions   • Codeine Nausea And Vomiting   • Contrast Dye Rash       Family History   Problem Relation Age of Onset   • Cancer Other    • Diabetes Other    • Heart disease Other    • Hypertension Other        Social History     Social History   • Marital status:      Spouse name: N/A   • Number of children: N/A   • Years of education: N/A     Occupational History   • Not on file.     Social History Main Topics   • Smoking status: Former Smoker     Years: 25.00     Types: Cigarettes     Quit date: 2011   • Smokeless tobacco: Never Used   • Alcohol use No   • Drug use: No   • Sexual  activity: Defer     Other Topics Concern   • Not on file     Social History Narrative   • No narrative on file       Review of Systems   Musculoskeletal: Positive for arthralgias, back pain, gait problem, joint swelling, myalgias, neck pain and neck stiffness.   Skin: Negative for color change, pallor, rash and wound.   All other systems reviewed and are negative.      Physical Exam   Skin: Skin is warm and dry. No rash noted. No erythema. No pallor.            Suture: Under local anesthesia of 1% Xylocaine with epinephrine an adequate prep with alcohol, the area is pierced under ultrasound guidance and aspirated.  20 cc of purulent material is removed.  This fluid is sent for Gram stain culture and sensitivity.  The mass is aspirated completely dry.  No bleeding is encountered.  The procedures terminated.    ASSESSMENT    Angelita was seen today for follow-up.    Diagnoses and all orders for this visit:    Cutaneous abscess of back excluding buttocks  -     Wound Culture - Wound, Buttock, Left    Other orders  -     sulfamethoxazole-trimethoprim (BACTRIM DS) 800-160 MG per tablet; Take 1 tablet by mouth 2 (Two) Times a Day.        PLAN    1.  Recheck in 2 days          This document has been electronically signed by Derik Michelle MD on July 11, 2018 9:21 PM

## 2018-07-13 LAB
LAB AP CASE REPORT: NORMAL
PATH REPORT.FINAL DX SPEC: NORMAL
PATH REPORT.GROSS SPEC: NORMAL

## 2018-07-15 LAB
BACTERIA SPEC AEROBE CULT: ABNORMAL
GRAM STN SPEC: ABNORMAL
GRAM STN SPEC: ABNORMAL

## 2018-07-16 ENCOUNTER — OFFICE VISIT (OUTPATIENT)
Dept: SURGERY | Facility: CLINIC | Age: 73
End: 2018-07-16

## 2018-07-16 VITALS
WEIGHT: 173 LBS | HEIGHT: 71 IN | DIASTOLIC BLOOD PRESSURE: 74 MMHG | SYSTOLIC BLOOD PRESSURE: 128 MMHG | BODY MASS INDEX: 24.22 KG/M2

## 2018-07-16 DIAGNOSIS — R22.42 HIP MASS, LEFT: Primary | ICD-10-CM

## 2018-07-16 PROCEDURE — 99024 POSTOP FOLLOW-UP VISIT: CPT | Performed by: SURGERY

## 2018-07-16 NOTE — PROGRESS NOTES
Chief Complaint   Patient presents with   • Post-op Follow-up     Post  operative Mass Left hip 18.        HPI  Status post excision of a pseudocyst from the left hip.  He is doing well.  The drainage continues from his [place].  Had no drainage from the wound specifically  Past Medical History:   Diagnosis Date   • Abdominal pain     no hernias noted- multiple repairs      • Anesthesia complication     states he , cardiac arrest for 2 min. with spinal cord stimulator surgery   • Asthma    • Backache    • Black lung disease (CMS/HCC)    • Cataract    • Constipation    • Coronary artery disease    • DJD (degenerative joint disease)     involving multiple joints   • Dyspnea     with exertion   • Emphysema of lung (CMS/HCC)    • Essential hypertension    • Generalized anxiety disorder    • Hallux valgus     right 2nd toe   • Hearing loss    • Heartburn    • Hemorrhoids    • Incisional hernia with obstruction, without gangrene    • Malignant tumor of prostate (CMS/HCC)    • MI (myocardial infarction)    • MRSA infection    • Plantar fasciitis     right heel   • PVD (peripheral vascular disease) (CMS/HCC)    • Sleep apnea     using c-pap   • Sleep disorder    • Urinary incontinence        Past Surgical History:   Procedure Laterality Date   • BLADDER SURGERY      AMS urinary control system   • CARDIAC SURGERY      cabg   • ENDOSCOPY AND COLONOSCOPY  2011    Diverticulosis in sigmoid colon. Hemorrhoids in anus. Edema of rectal mucosa. Multiple biopsies taken. Lipoma in cecum. Multiple biopsies taken   • HERNIA REPAIR  2004    Left inguinal hernioplasty with mesh   • PROSTATECTOMY     • SPINAL CORD STIMULATOR IMPLANT     • TRANSESOPHAGEAL ECHOCARDIOGRAM ([place])  2008    without color flow-Normal systolic function with evidence of left ventricular hypertrophy   • TRUNK LESION/CYST EXCISION Left 2018    Procedure: EXCISION MASS LEFT HIP;  Surgeon: Derik Michelle MD;  Location: [facility]  OR;  Service: General   • UMBILICAL HERNIA REPAIR  09/12/2006    Primary umbilical hernioplasty   • VENTRAL HERNIA REPAIR N/A 11/14/2017    Procedure: LAPAROSCOPIC POSSIBLE OPEN VENTRAL/INCISIONAL HERNIA REPAIR WITH MESH;  Surgeon: Derik Michelle MD;  Location: [facility] OR;  Service:          Current Outpatient Prescriptions:   •  acetaminophen (TYLENOL) 650 MG 8 hr tablet, Take 650 mg by mouth Every 8 (Eight) Hours As Needed., Disp: , Rfl:   •  albuterol (PROVENTIL HFA;VENTOLIN HFA) 108 (90 BASE) MCG/ACT inhaler, Inhale 2 puffs Every 4 (Four) Hours As Needed for wheezing., Disp: , Rfl:   •  albuterol (PROVENTIL) (2.5 MG/3ML) 0.083% nebulizer solution, Inhale 2.5 mg 4 (Four) Times a Day., Disp: , Rfl:   •  aluminum hydroxide-mag trisilicate (GAVISCON) 80-20 MG chewable tablet chewable tablet, Chew 1 tablet As Needed., Disp: , Rfl:   •  aspirin 81 MG EC tablet, Take 81 mg by mouth Daily. Last dose 11/7/17, Disp: , Rfl:   •  bumetanide (BUMEX) 2 MG tablet, Take 2 mg by mouth Daily., Disp: , Rfl: 3  •  carvedilol (COREG) 12.5 MG tablet, Take 12.5 mg by mouth 2 (Two) Times a Day., Disp: , Rfl:   •  clonazePAM (KlonoPIN) 0.5 MG tablet, Take 0.25 mg by mouth At Night As Needed., Disp: , Rfl:   •  clopidogrel (PLAVIX) 75 MG tablet, Take 75 mg by mouth Daily. Last dose 11/7/17, Disp: , Rfl:   •  cyclobenzaprine (FLEXERIL) 10 MG tablet, Take 10 mg by mouth 3 (Three) Times a Day As Needed for muscle spasms., Disp: , Rfl:   •  DALIRESP 500 MCG tablet tablet, Take 500 mcg by mouth Daily., Disp: , Rfl: 11  •  DULERA 200-5 MCG/ACT inhaler, Inhale 2 puffs 2 (Two) Times a Day., Disp: , Rfl: 11  •  famotidine (PEPCID) 20 MG tablet, Take 20 mg by mouth Daily. Taking at lunch, Disp: , Rfl:   •  HYDROcodone-acetaminophen (NORCO) 7.5-325 MG per tablet, Take 1 tablet by mouth Every 4 (Four) Hours As Needed for Moderate Pain  (Pain)., Disp: 20 tablet, Rfl: 0  •  isosorbide mononitrate (IMDUR) 30 MG 24 hr tablet, Take 30 mg by mouth Daily.,  Disp: , Rfl:   •  ketoconazole (NIZORAL) 2 % shampoo, Apply  topically Daily., Disp: , Rfl:   •  lisinopril (PRINIVIL,ZESTRIL) 2.5 MG tablet, Take 2.5 mg by mouth 2 (Two) Times a Day., Disp: , Rfl:   •  MUCINEX 600 MG 12 hr tablet, Take 600 mg by mouth 2 (Two) Times a Day., Disp: , Rfl: 4  •  Multiple Vitamins-Minerals (MULTIVITAMIN ADULT PO), Take 1 tablet by mouth Daily., Disp: , Rfl:   •  nitroglycerin (NITROSTAT) 0.4 MG SL tablet, Place 0.4 mg under the tongue Every 5 (Five) Minutes As Needed for chest pain. Take no more than 3 doses in 15 minutes., Disp: , Rfl:   •  O2 (OXYGEN), Inhale 2 L/min Daily., Disp: , Rfl:   •  potassium chloride (K-DUR) 10 MEQ CR tablet, Take 10 mEq by mouth Daily. Takes at lunch, Disp: , Rfl:   •  pravastatin (PRAVACHOL) 40 MG tablet, Take 40 mg by mouth Every Night., Disp: , Rfl:   •  pregabalin (LYRICA) 150 MG capsule, Take 150 mg by mouth 2 (Two) Times a Day., Disp: , Rfl:   •  promethazine (PHENERGAN) 25 MG tablet, Take 25 mg by mouth Every 6 (Six) Hours As Needed., Disp: , Rfl:   •  SPIRIVA RESPIMAT 2.5 MCG/ACT aerosol solution, Inhale 1 puff 2 (Two) Times a Day., Disp: , Rfl: 11  •  sucralfate (CARAFATE) 1 G tablet, Take 1 g by mouth 2 (Two) Times a Day., Disp: , Rfl:   •  sulfamethoxazole-trimethoprim (BACTRIM DS) 800-160 MG per tablet, Take 1 tablet by mouth 2 (Two) Times a Day., Disp: 20 tablet, Rfl: 0  •  tapentadol (NUCYNTA) 75 MG tablet, Take 75 mg by mouth 4 (Four) Times a Day As Needed., Disp: , Rfl:     Allergies   Allergen Reactions   • Codeine Nausea And Vomiting   • Contrast Dye Rash       Family History   Problem Relation Age of Onset   • Cancer Other    • Diabetes Other    • Heart disease Other    • Hypertension Other        Social History     Social History   • Marital status:      Spouse name: N/A   • Number of children: N/A   • Years of education: N/A     Occupational History   • Not on file.     Social History Main Topics   • Smoking status: Former  Smoker     Years: 25.00     Types: Cigarettes     Quit date: 2011   • Smokeless tobacco: Never Used   • Alcohol use No   • Drug use: No   • Sexual activity: Defer     Other Topics Concern   • Not on file     Social History Narrative   • No narrative on file       Review of Systems  Nothing to add  Physical Exam   Skin: Skin is warm and dry. No rash noted. No erythema. No pallor.              ASSESSMENT    Claudis was seen today for post-op follow-up.    Diagnoses and all orders for this visit:    Hip mass, left        PLAN    1.  Recheck in 5 days  2.  We'll consider drain removal at that time.          This document has been electronically signed by Derik Michelle MD on July 17, 2018 9:50 PM

## 2018-07-16 NOTE — PATIENT INSTRUCTIONS

## 2018-07-20 ENCOUNTER — OFFICE VISIT (OUTPATIENT)
Dept: SURGERY | Facility: CLINIC | Age: 73
End: 2018-07-20

## 2018-07-20 VITALS
DIASTOLIC BLOOD PRESSURE: 80 MMHG | BODY MASS INDEX: 24.36 KG/M2 | HEIGHT: 71 IN | WEIGHT: 174 LBS | SYSTOLIC BLOOD PRESSURE: 120 MMHG

## 2018-07-20 DIAGNOSIS — T79.2XXA POST-TRAUMATIC SEROMA (HCC): Primary | ICD-10-CM

## 2018-07-20 PROCEDURE — 99024 POSTOP FOLLOW-UP VISIT: CPT | Performed by: SURGERY

## 2018-07-20 RX ORDER — SULFAMETHOXAZOLE AND TRIMETHOPRIM 800; 160 MG/1; MG/1
160 TABLET ORAL
COMMUNITY
Start: 2018-07-09 | End: 2019-01-21

## 2018-07-20 RX ORDER — CETIRIZINE HYDROCHLORIDE 10 MG/1
TABLET ORAL
Refills: 3 | Status: ON HOLD | COMMUNITY
Start: 2018-07-18 | End: 2019-01-28

## 2018-07-20 NOTE — PATIENT INSTRUCTIONS

## 2018-07-30 ENCOUNTER — OFFICE VISIT (OUTPATIENT)
Dept: SURGERY | Facility: CLINIC | Age: 73
End: 2018-07-30

## 2018-07-30 VITALS
HEIGHT: 71 IN | SYSTOLIC BLOOD PRESSURE: 122 MMHG | BODY MASS INDEX: 24.42 KG/M2 | WEIGHT: 174.4 LBS | DIASTOLIC BLOOD PRESSURE: 70 MMHG

## 2018-07-30 DIAGNOSIS — R22.42 MASS OF LEFT THIGH: Primary | ICD-10-CM

## 2018-07-30 PROCEDURE — 99024 POSTOP FOLLOW-UP VISIT: CPT | Performed by: SURGERY

## 2018-07-30 RX ORDER — BUDESONIDE 0.5 MG/2ML
0.5 INHALANT ORAL
Refills: 11 | COMMUNITY
Start: 2018-07-24 | End: 2019-03-21

## 2018-07-30 RX ORDER — AMOXICILLIN 500 MG/1
1 CAPSULE ORAL 3 TIMES DAILY
Refills: 0 | COMMUNITY
Start: 2018-07-24 | End: 2019-01-21

## 2018-08-01 PROBLEM — R22.42 MASS OF LEFT THIGH: Status: ACTIVE | Noted: 2018-08-01

## 2018-08-02 NOTE — PROGRESS NOTES
Chief Complaint   Patient presents with   • Follow-up     Recheck Mass left hip.        HPI  The last summary.  Since last visit since the drain has been removed, he has recurred small amount of fluid.  This is not nearly as serious as what he had before.  Past Medical History:   Diagnosis Date   • Abdominal pain     no hernias noted- multiple repairs      • Anesthesia complication     states he , cardiac arrest for 2 min. with spinal cord stimulator surgery   • Asthma    • Backache    • Black lung disease (CMS/HCC)    • Cataract    • Constipation    • Coronary artery disease    • DJD (degenerative joint disease)     involving multiple joints   • Dyspnea     with exertion   • Emphysema of lung (CMS/HCC)    • Essential hypertension    • Generalized anxiety disorder    • Hallux valgus     right 2nd toe   • Hearing loss    • Heartburn    • Hemorrhoids    • Incisional hernia with obstruction, without gangrene    • Malignant tumor of prostate (CMS/HCC)    • MI (myocardial infarction)    • MRSA infection    • Plantar fasciitis     right heel   • PVD (peripheral vascular disease) (CMS/HCC)    • Sleep apnea     using c-pap   • Sleep disorder    • Urinary incontinence        Past Surgical History:   Procedure Laterality Date   • BLADDER SURGERY      AMS urinary control system   • CARDIAC SURGERY      cabg   • ENDOSCOPY AND COLONOSCOPY  2011    Diverticulosis in sigmoid colon. Hemorrhoids in anus. Edema of rectal mucosa. Multiple biopsies taken. Lipoma in cecum. Multiple biopsies taken   • HERNIA REPAIR  2004    Left inguinal hernioplasty with mesh   • PROSTATECTOMY     • SPINAL CORD STIMULATOR IMPLANT     • TRANSESOPHAGEAL ECHOCARDIOGRAM ([place])  2008    without color flow-Normal systolic function with evidence of left ventricular hypertrophy   • TRUNK LESION/CYST EXCISION Left 2018    Procedure: EXCISION MASS LEFT HIP;  Surgeon: Derik Michelle MD;  Location: [facility];  Service: General   •  UMBILICAL HERNIA REPAIR  09/12/2006    Primary umbilical hernioplasty   • VENTRAL HERNIA REPAIR N/A 11/14/2017    Procedure: LAPAROSCOPIC POSSIBLE OPEN VENTRAL/INCISIONAL HERNIA REPAIR WITH MESH;  Surgeon: Derik Michelle MD;  Location: [facility];  Service:          Current Outpatient Prescriptions:   •  acetaminophen (TYLENOL) 650 MG 8 hr tablet, Take 650 mg by mouth Every 8 (Eight) Hours As Needed., Disp: , Rfl:   •  albuterol (PROVENTIL HFA;VENTOLIN HFA) 108 (90 BASE) MCG/ACT inhaler, Inhale 2 puffs Every 4 (Four) Hours As Needed for wheezing., Disp: , Rfl:   •  albuterol (PROVENTIL) (2.5 MG/3ML) 0.083% nebulizer solution, Inhale 2.5 mg 4 (Four) Times a Day., Disp: , Rfl:   •  aluminum hydroxide-mag trisilicate (GAVISCON) 80-20 MG chewable tablet chewable tablet, Chew 1 tablet As Needed., Disp: , Rfl:   •  amoxicillin (AMOXIL) 500 MG capsule, Take 1 capsule by mouth 3 (Three) Times a Day., Disp: , Rfl: 0  •  aspirin 81 MG EC tablet, Take 81 mg by mouth Daily. Last dose 11/7/17, Disp: , Rfl:   •  budesonide (PULMICORT) 0.5 MG/2ML nebulizer solution, , Disp: , Rfl: 11  •  bumetanide (BUMEX) 2 MG tablet, Take 2 mg by mouth Daily., Disp: , Rfl: 3  •  carvedilol (COREG) 12.5 MG tablet, Take 12.5 mg by mouth 2 (Two) Times a Day., Disp: , Rfl:   •  cetirizine (zyrTEC) 10 MG tablet, , Disp: , Rfl: 3  •  clonazePAM (KlonoPIN) 0.5 MG tablet, Take 0.25 mg by mouth At Night As Needed., Disp: , Rfl:   •  clopidogrel (PLAVIX) 75 MG tablet, Take 75 mg by mouth Daily. Last dose 11/7/17, Disp: , Rfl:   •  cyclobenzaprine (FLEXERIL) 10 MG tablet, Take 10 mg by mouth 3 (Three) Times a Day As Needed for muscle spasms., Disp: , Rfl:   •  DALIRESP 500 MCG tablet tablet, Take 500 mcg by mouth Daily., Disp: , Rfl: 11  •  DULERA 200-5 MCG/ACT inhaler, Inhale 2 puffs 2 (Two) Times a Day., Disp: , Rfl: 11  •  famotidine (PEPCID) 20 MG tablet, Take 20 mg by mouth Daily. Taking at lunch, Disp: , Rfl:   •  HYDROcodone-acetaminophen (NORCO)  7.5-325 MG per tablet, Take 1 tablet by mouth Every 4 (Four) Hours As Needed for Moderate Pain  (Pain)., Disp: 20 tablet, Rfl: 0  •  isosorbide mononitrate (IMDUR) 30 MG 24 hr tablet, Take 30 mg by mouth Daily., Disp: , Rfl:   •  ketoconazole (NIZORAL) 2 % shampoo, Apply  topically Daily., Disp: , Rfl:   •  lisinopril (PRINIVIL,ZESTRIL) 2.5 MG tablet, Take 2.5 mg by mouth 2 (Two) Times a Day., Disp: , Rfl:   •  MUCINEX 600 MG 12 hr tablet, Take 600 mg by mouth 2 (Two) Times a Day., Disp: , Rfl: 4  •  Multiple Vitamins-Minerals (MULTIVITAMIN ADULT PO), Take 1 tablet by mouth Daily., Disp: , Rfl:   •  nitroglycerin (NITROSTAT) 0.4 MG SL tablet, Place 0.4 mg under the tongue Every 5 (Five) Minutes As Needed for chest pain. Take no more than 3 doses in 15 minutes., Disp: , Rfl:   •  O2 (OXYGEN), Inhale 2 L/min Daily., Disp: , Rfl:   •  potassium chloride (K-DUR) 10 MEQ CR tablet, Take 10 mEq by mouth Daily. Takes at lunch, Disp: , Rfl:   •  pravastatin (PRAVACHOL) 40 MG tablet, Take 40 mg by mouth Every Night., Disp: , Rfl:   •  pregabalin (LYRICA) 150 MG capsule, Take 150 mg by mouth 2 (Two) Times a Day., Disp: , Rfl:   •  promethazine (PHENERGAN) 25 MG tablet, Take 25 mg by mouth Every 6 (Six) Hours As Needed., Disp: , Rfl:   •  SPIRIVA RESPIMAT 2.5 MCG/ACT aerosol solution, Inhale 1 puff 2 (Two) Times a Day., Disp: , Rfl: 11  •  sucralfate (CARAFATE) 1 G tablet, Take 1 g by mouth 2 (Two) Times a Day., Disp: , Rfl:   •  sulfamethoxazole-trimethoprim (BACTRIM DS) 800-160 MG per tablet, Take 1 tablet by mouth 2 (Two) Times a Day., Disp: 20 tablet, Rfl: 0  •  sulfamethoxazole-trimethoprim (BACTRIM DS,SEPTRA DS) 800-160 MG per tablet, Take 160 mg by mouth., Disp: , Rfl:   •  tapentadol (NUCYNTA) 75 MG tablet, Take 75 mg by mouth 4 (Four) Times a Day As Needed., Disp: , Rfl:     Allergies   Allergen Reactions   • Codeine Nausea And Vomiting   • Contrast Dye Rash       Family History   Problem Relation Age of Onset   •  Cancer Other    • Diabetes Other    • Heart disease Other    • Hypertension Other        Social History     Social History   • Marital status:      Spouse name: N/A   • Number of children: N/A   • Years of education: N/A     Occupational History   • Not on file.     Social History Main Topics   • Smoking status: Former Smoker     Years: 25.00     Types: Cigarettes     Quit date: 2011   • Smokeless tobacco: Never Used   • Alcohol use No   • Drug use: No   • Sexual activity: Defer     Other Topics Concern   • Not on file     Social History Narrative   • No narrative on file       Review of Systems  Not repeated  Physical Exam  Area with the cyst has been removed is healing nicely.  All sutures up and removed.  There is minimal recurrence of fluid at this time.    ASSESSMENT    Angelita was seen today for follow-up.    Diagnoses and all orders for this visit:    Mass of left thigh        PLAN    1.  Check in 1 month          This document has been electronically signed by Derik Michelle MD on August 1, 2018 8:43 PM

## 2018-08-27 ENCOUNTER — OFFICE VISIT (OUTPATIENT)
Dept: SURGERY | Facility: CLINIC | Age: 73
End: 2018-08-27

## 2018-08-27 VITALS
WEIGHT: 170 LBS | TEMPERATURE: 98 F | DIASTOLIC BLOOD PRESSURE: 70 MMHG | HEIGHT: 71 IN | BODY MASS INDEX: 23.8 KG/M2 | HEART RATE: 91 BPM | SYSTOLIC BLOOD PRESSURE: 110 MMHG

## 2018-08-27 DIAGNOSIS — R22.42 MASS OF LEFT THIGH: Primary | ICD-10-CM

## 2018-08-27 PROCEDURE — 99024 POSTOP FOLLOW-UP VISIT: CPT | Performed by: SURGERY

## 2018-08-27 NOTE — PATIENT INSTRUCTIONS

## 2018-10-29 ENCOUNTER — OFFICE VISIT (OUTPATIENT)
Dept: SURGERY | Facility: CLINIC | Age: 73
End: 2018-10-29

## 2018-10-29 VITALS
BODY MASS INDEX: 24.5 KG/M2 | HEIGHT: 71 IN | TEMPERATURE: 98 F | WEIGHT: 175 LBS | SYSTOLIC BLOOD PRESSURE: 140 MMHG | HEART RATE: 84 BPM | DIASTOLIC BLOOD PRESSURE: 80 MMHG

## 2018-10-29 DIAGNOSIS — L98.9 SKIN LESION: Primary | ICD-10-CM

## 2018-10-29 PROCEDURE — 99024 POSTOP FOLLOW-UP VISIT: CPT | Performed by: SURGERY

## 2018-10-29 NOTE — PROGRESS NOTES
CHIEF COMPLAINT:   Chief Complaint   Patient presents with   • Follow-up     Recheck mass left hip       HPI: This patient presents for a post-operative visit after undergoing excision of skin lesions. Doing well- no cellulitis, wound separation, or significant pain.      Physical Exam  All wounds are healing well.  There is been no recurrence of the cyst  ASSESSMENT:    Angelita was seen today for follow-up.    Diagnoses and all orders for this visit:    Skin lesion        PLAN:    1. The patient will follow-up as needed for his skin lesion; he is to recheck in a couple of months with regards to his lower abdominal and groin pain  2. May shower.   3. May return to normal activity without restrictions.

## 2018-10-29 NOTE — PATIENT INSTRUCTIONS

## 2019-01-02 ENCOUNTER — HOSPITAL ENCOUNTER (OUTPATIENT)
Dept: GENERAL RADIOLOGY | Facility: HOSPITAL | Age: 74
Discharge: HOME | End: 2019-01-02

## 2019-01-02 ENCOUNTER — HOSPITAL ENCOUNTER (OUTPATIENT)
Dept: ULTRASOUND IMAGING | Facility: HOSPITAL | Age: 74
Discharge: HOME | End: 2019-01-02
Admitting: UROLOGY

## 2019-01-02 DIAGNOSIS — T83.69XA: ICD-10-CM

## 2019-01-02 DIAGNOSIS — N21.0 STONE, BLADDER: ICD-10-CM

## 2019-01-02 DIAGNOSIS — N21.0 STONE, BLADDER: Primary | ICD-10-CM

## 2019-01-02 PROCEDURE — 74018 RADEX ABDOMEN 1 VIEW: CPT

## 2019-01-02 PROCEDURE — 76999 ECHO EXAMINATION PROCEDURE: CPT

## 2019-01-21 ENCOUNTER — PREP FOR SURGERY (OUTPATIENT)
Dept: OTHER | Facility: HOSPITAL | Age: 74
End: 2019-01-21

## 2019-01-21 DIAGNOSIS — N36.8 OTHER SPECIFIED DISORDERS OF URETHRA: Primary | ICD-10-CM

## 2019-01-22 PROBLEM — N36.8 OTHER SPECIFIED DISORDERS OF URETHRA: Status: ACTIVE | Noted: 2019-01-22

## 2019-01-24 ENCOUNTER — ANESTHESIA EVENT (OUTPATIENT)
Dept: PERIOP | Facility: HOSPITAL | Age: 74
End: 2019-01-24

## 2019-01-28 ENCOUNTER — ANESTHESIA (OUTPATIENT)
Dept: PERIOP | Facility: HOSPITAL | Age: 74
End: 2019-01-28

## 2019-01-28 ENCOUNTER — HOSPITAL ENCOUNTER (OUTPATIENT)
Facility: HOSPITAL | Age: 74
Setting detail: HOSPITAL OUTPATIENT SURGERY
Discharge: HOME | End: 2019-01-28
Attending: UROLOGY | Admitting: UROLOGY

## 2019-01-28 ENCOUNTER — APPOINTMENT (OUTPATIENT)
Dept: GENERAL RADIOLOGY | Facility: HOSPITAL | Age: 74
End: 2019-01-28

## 2019-01-28 VITALS
HEIGHT: 71 IN | SYSTOLIC BLOOD PRESSURE: 150 MMHG | TEMPERATURE: 97.5 F | HEART RATE: 76 BPM | OXYGEN SATURATION: 100 % | RESPIRATION RATE: 22 BRPM | DIASTOLIC BLOOD PRESSURE: 72 MMHG | WEIGHT: 170.42 LBS | BODY MASS INDEX: 23.86 KG/M2

## 2019-01-28 DIAGNOSIS — N36.8 OTHER SPECIFIED DISORDERS OF URETHRA: ICD-10-CM

## 2019-01-28 LAB
ANION GAP SERPL CALCULATED.3IONS-SCNC: 4 MMOL/L (ref 5–15)
BUN BLD-MCNC: 26 MG/DL (ref 7–21)
BUN/CREAT SERPL: 27.7 (ref 7–25)
CALCIUM SPEC-SCNC: 9.2 MG/DL (ref 8.4–10.2)
CHLORIDE SERPL-SCNC: 102 MMOL/L (ref 95–110)
CO2 SERPL-SCNC: 33 MMOL/L (ref 22–31)
CREAT BLD-MCNC: 0.94 MG/DL (ref 0.7–1.3)
GFR SERPL CREATININE-BSD FRML MDRD: 79 ML/MIN/1.73 (ref 42–98)
GLUCOSE BLD-MCNC: 106 MG/DL (ref 60–100)
POTASSIUM BLD-SCNC: 3.6 MMOL/L (ref 3.5–5.1)
SODIUM BLD-SCNC: 139 MMOL/L (ref 137–145)

## 2019-01-28 PROCEDURE — 80048 BASIC METABOLIC PNL TOTAL CA: CPT | Performed by: ANESTHESIOLOGY

## 2019-01-28 PROCEDURE — 25010000002 PROPOFOL 10 MG/ML EMULSION: Performed by: NURSE ANESTHETIST, CERTIFIED REGISTERED

## 2019-01-28 PROCEDURE — 25010000002 MIDAZOLAM PER 1 MG: Performed by: NURSE ANESTHETIST, CERTIFIED REGISTERED

## 2019-01-28 PROCEDURE — 93005 ELECTROCARDIOGRAM TRACING: CPT | Performed by: ANESTHESIOLOGY

## 2019-01-28 PROCEDURE — C1769 GUIDE WIRE: HCPCS | Performed by: UROLOGY

## 2019-01-28 PROCEDURE — C1758 CATHETER, URETERAL: HCPCS | Performed by: UROLOGY

## 2019-01-28 PROCEDURE — 93010 ELECTROCARDIOGRAM REPORT: CPT | Performed by: INTERNAL MEDICINE

## 2019-01-28 RX ORDER — ONDANSETRON 2 MG/ML
4 INJECTION INTRAMUSCULAR; INTRAVENOUS ONCE AS NEEDED
Status: CANCELLED | OUTPATIENT
Start: 2019-01-28

## 2019-01-28 RX ORDER — MIDAZOLAM HYDROCHLORIDE 1 MG/ML
INJECTION INTRAMUSCULAR; INTRAVENOUS AS NEEDED
Status: DISCONTINUED | OUTPATIENT
Start: 2019-01-28 | End: 2019-01-28 | Stop reason: SURG

## 2019-01-28 RX ORDER — LABETALOL HYDROCHLORIDE 5 MG/ML
5 INJECTION, SOLUTION INTRAVENOUS
Status: CANCELLED | OUTPATIENT
Start: 2019-01-28

## 2019-01-28 RX ORDER — KETAMINE HYDROCHLORIDE 100 MG/ML
INJECTION INTRAMUSCULAR; INTRAVENOUS AS NEEDED
Status: DISCONTINUED | OUTPATIENT
Start: 2019-01-28 | End: 2019-01-28 | Stop reason: SURG

## 2019-01-28 RX ORDER — PROPOFOL 10 MG/ML
VIAL (ML) INTRAVENOUS AS NEEDED
Status: DISCONTINUED | OUTPATIENT
Start: 2019-01-28 | End: 2019-01-28 | Stop reason: SURG

## 2019-01-28 RX ORDER — NALOXONE HCL 0.4 MG/ML
0.2 VIAL (ML) INJECTION AS NEEDED
Status: CANCELLED | OUTPATIENT
Start: 2019-01-28

## 2019-01-28 RX ORDER — LIDOCAINE HYDROCHLORIDE 10 MG/ML
INJECTION, SOLUTION INFILTRATION; PERINEURAL AS NEEDED
Status: DISCONTINUED | OUTPATIENT
Start: 2019-01-28 | End: 2019-01-28 | Stop reason: SURG

## 2019-01-28 RX ORDER — SULFAMETHOXAZOLE AND TRIMETHOPRIM 800; 160 MG/1; MG/1
1 TABLET ORAL 2 TIMES DAILY
Qty: 30 TABLET | Refills: 0 | Status: SHIPPED | OUTPATIENT
Start: 2019-01-28 | End: 2019-03-21

## 2019-01-28 RX ORDER — FLUMAZENIL 0.1 MG/ML
0.2 INJECTION INTRAVENOUS AS NEEDED
Status: CANCELLED | OUTPATIENT
Start: 2019-01-28

## 2019-01-28 RX ORDER — ACETAMINOPHEN 650 MG/1
650 SUPPOSITORY RECTAL ONCE AS NEEDED
Status: CANCELLED | OUTPATIENT
Start: 2019-01-28

## 2019-01-28 RX ORDER — OXYCODONE HYDROCHLORIDE AND ACETAMINOPHEN 5; 325 MG/1; MG/1
1-2 TABLET ORAL EVERY 6 HOURS PRN
Qty: 30 TABLET | Refills: 0 | Status: ON HOLD | OUTPATIENT
Start: 2019-01-28 | End: 2019-03-22

## 2019-01-28 RX ORDER — OXYCODONE HYDROCHLORIDE AND ACETAMINOPHEN 5; 325 MG/1; MG/1
1 TABLET ORAL ONCE AS NEEDED
Status: DISCONTINUED | OUTPATIENT
Start: 2019-01-28 | End: 2019-01-28 | Stop reason: HOSPADM

## 2019-01-28 RX ORDER — ACETAMINOPHEN 325 MG/1
650 TABLET ORAL ONCE AS NEEDED
Status: CANCELLED | OUTPATIENT
Start: 2019-01-28

## 2019-01-28 RX ORDER — EPHEDRINE SULFATE 50 MG/ML
5 INJECTION, SOLUTION INTRAVENOUS ONCE AS NEEDED
Status: CANCELLED | OUTPATIENT
Start: 2019-01-28

## 2019-01-28 RX ORDER — ALBUTEROL SULFATE 2.5 MG/3ML
2.5 SOLUTION RESPIRATORY (INHALATION) ONCE AS NEEDED
Status: COMPLETED | OUTPATIENT
Start: 2019-01-28 | End: 2019-01-28

## 2019-01-28 RX ORDER — DIPHENHYDRAMINE HYDROCHLORIDE 50 MG/ML
12.5 INJECTION INTRAMUSCULAR; INTRAVENOUS
Status: CANCELLED | OUTPATIENT
Start: 2019-01-28

## 2019-01-28 RX ORDER — SODIUM CHLORIDE, SODIUM GLUCONATE, SODIUM ACETATE, POTASSIUM CHLORIDE, AND MAGNESIUM CHLORIDE 526; 502; 368; 37; 30 MG/100ML; MG/100ML; MG/100ML; MG/100ML; MG/100ML
1000 INJECTION, SOLUTION INTRAVENOUS CONTINUOUS
Status: DISCONTINUED | OUTPATIENT
Start: 2019-01-28 | End: 2019-01-28 | Stop reason: HOSPADM

## 2019-01-28 RX ADMIN — PROPOFOL 15 MG: 10 INJECTION, EMULSION INTRAVENOUS at 09:25

## 2019-01-28 RX ADMIN — KETAMINE HYDROCHLORIDE 10 MG: 100 INJECTION INTRAMUSCULAR; INTRAVENOUS at 09:30

## 2019-01-28 RX ADMIN — CEFAZOLIN 1 G: 1 INJECTION, POWDER, FOR SOLUTION INTRAMUSCULAR; INTRAVENOUS; PARENTERAL at 09:13

## 2019-01-28 RX ADMIN — SODIUM CHLORIDE, SODIUM GLUCONATE, SODIUM ACETATE, POTASSIUM CHLORIDE, AND MAGNESIUM CHLORIDE: 526; 502; 368; 37; 30 INJECTION, SOLUTION INTRAVENOUS at 09:04

## 2019-01-28 RX ADMIN — PROPOFOL 40 MG: 10 INJECTION, EMULSION INTRAVENOUS at 09:23

## 2019-01-28 RX ADMIN — ALBUTEROL SULFATE 2.5 MG: 2.5 SOLUTION RESPIRATORY (INHALATION) at 10:21

## 2019-01-28 RX ADMIN — KETAMINE HYDROCHLORIDE 20 MG: 100 INJECTION INTRAMUSCULAR; INTRAVENOUS at 09:13

## 2019-01-28 RX ADMIN — PROPOFOL 20 MG: 10 INJECTION, EMULSION INTRAVENOUS at 09:21

## 2019-01-28 RX ADMIN — PROPOFOL 40 MG: 10 INJECTION, EMULSION INTRAVENOUS at 09:13

## 2019-01-28 RX ADMIN — LIDOCAINE HYDROCHLORIDE 50 MG: 10 INJECTION, SOLUTION INFILTRATION; PERINEURAL at 09:13

## 2019-01-28 RX ADMIN — SODIUM CHLORIDE, SODIUM GLUCONATE, SODIUM ACETATE, POTASSIUM CHLORIDE, AND MAGNESIUM CHLORIDE 1000 ML: 526; 502; 368; 37; 30 INJECTION, SOLUTION INTRAVENOUS at 07:49

## 2019-01-28 RX ADMIN — MIDAZOLAM HYDROCHLORIDE 1 MG: 2 INJECTION, SOLUTION INTRAMUSCULAR; INTRAVENOUS at 09:03

## 2019-01-28 RX ADMIN — PROPOFOL 25 MG: 10 INJECTION, EMULSION INTRAVENOUS at 09:30

## 2019-01-28 RX ADMIN — PROPOFOL 20 MG: 10 INJECTION, EMULSION INTRAVENOUS at 09:33

## 2019-01-28 NOTE — BRIEF OP NOTE
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      Complications: none     Anna M. D'Amico, MD     Date: 1/28/2019  Time: 9:53 AM

## 2019-01-28 NOTE — INTERVAL H&P NOTE
H&P updated. The patient was examined and the following changes are noted:  Pt had a bad breathing episode this morning, but is now improved.

## 2019-01-28 NOTE — ADDENDUM NOTE
Addendum  created 01/28/19 1020 by Sarah Meadows, CRNA    Order list changed, Order sets accessed

## 2019-01-28 NOTE — ANESTHESIA POSTPROCEDURE EVALUATION
Patient: Angelita Barraza Jr.    Procedure Summary     Date:  01/28/19 Room / Location:  [facility] OR 02 / [facility] OR    Anesthesia Start:  0908 Anesthesia Stop:  0947    Procedure:  Cystoscopy (Right ) Diagnosis:       Other specified disorders of urethra      (Other specified disorders of urethra [N36.8])    Surgeon:  D'Amico, Anna M., MD Provider:  Sukh Sharma MD    Anesthesia Type:  MAC ASA Status:  4          Anesthesia Type: MAC  Last vitals  BP   132/69 (01/28/19 0648)   Temp   98 °F (36.7 °C) (01/28/19 0648)   Pulse   64 (01/28/19 0648)   Resp   20 (01/28/19 0648)     SpO2   97 % (01/28/19 0648)     Post Anesthesia Care and Evaluation    Patient location during evaluation: bedside  Patient participation: complete - patient participated  Level of consciousness: awake and awake and alert  Pain score: 0  Pain management: satisfactory to patient  Airway patency: patent  Anesthetic complications: No anesthetic complications  PONV Status: none  Cardiovascular status: acceptable and stable  Respiratory status: acceptable, room air and spontaneous ventilation  Hydration status: acceptable

## 2019-01-28 NOTE — NURSING NOTE
Patient got up and dressed and noticed to have more wheezing after dressing both inspiratory and expiratory. Sao2 is 94% on 2.5L. Jared Meadows CRNA notified and new order to give patient a breathing treatment before he goes home.

## 2019-01-28 NOTE — ANESTHESIA PREPROCEDURE EVALUATION
Anesthesia Evaluation     history of anesthetic complications (coded in [place] for spinal cord stimulator):  NPO Solid Status: > 8 hours  NPO Liquid Status: > 2 hours           Airway   Mallampati: II  TM distance: >3 FB  Neck ROM: full  no difficulty expected  Dental    (+) poor dentition    Pulmonary    (+) a smoker Former, COPD severe, asthma, shortness of breath, sleep apnea on CPAP, decreased breath sounds,     ROS comment: Black lung  Home O2 at 2.5L  On steroids  Cardiovascular   Exercise tolerance: poor (<4 METS)    ECG reviewed  PT is on anticoagulation therapy  Patient on routine beta blocker and Beta blocker given within 24 hours of surgery  Rhythm: regular  Rate: normal    (+) hypertension poorly controlled, past MI  >12 months, CAD, CABG > 6 Months, cardiac stents more than 12 months ago PVD, hyperlipidemia,   (-) angina, murmur    ROS comment:   ** Suspect arm lead reversal, interpretation assumes no reversal  Sinus bradycardia with 1st degree AV block  Right bundle branch block , plus right ventricular hypertrophy  Abnormal ECG  When compared with ECG of 08-NOV-2017 15:57,  premature atrial complexes are no longer present  Questionable change in QRS axis  Confirmed by NOA SALCIDO MD    Neuro/Psych  (+) psychiatric history Anxiety,     (-) seizures, CVA, headaches    ROS Comment: Spinal cord stimulator in place.  GI/Hepatic/Renal/Endo    (+)  GERD well controlled,  renal disease,   (-) hepatitis, liver disease, diabetes, hypothyroidism    ROS Comment: Spinal cord stimulator    Musculoskeletal     (+) arthralgias, back pain,   Abdominal     Abdomen: soft.   Substance History - negative use     OB/GYN          Other   (+) arthritis (hands)   history of cancer (prostate)                      Anesthesia Plan    ASA 4     MAC     intravenous induction   Anesthetic plan, all risks, benefits, and alternatives have been provided, discussed and informed consent has been obtained with: patient and  child.

## 2019-02-11 ENCOUNTER — HOSPITAL ENCOUNTER (OUTPATIENT)
Facility: HOSPITAL | Age: 74
Setting detail: HOSPITAL OUTPATIENT SURGERY
End: 2019-02-11
Attending: UROLOGY | Admitting: UROLOGY

## 2019-02-11 ENCOUNTER — PREP FOR SURGERY (OUTPATIENT)
Dept: OTHER | Facility: HOSPITAL | Age: 74
End: 2019-02-11

## 2019-02-11 ENCOUNTER — HOSPITAL ENCOUNTER (OUTPATIENT)
Dept: ULTRASOUND IMAGING | Facility: HOSPITAL | Age: 74
Discharge: HOME | End: 2019-02-11
Admitting: UROLOGY

## 2019-02-11 DIAGNOSIS — T83.69XD: Primary | ICD-10-CM

## 2019-02-11 DIAGNOSIS — T85.698D MALFUNCTION OF DEVICE, SUBSEQUENT ENCOUNTER: ICD-10-CM

## 2019-02-11 DIAGNOSIS — T85.698D MALFUNCTION OF DEVICE, SUBSEQUENT ENCOUNTER: Primary | ICD-10-CM

## 2019-02-11 PROCEDURE — 76705 ECHO EXAM OF ABDOMEN: CPT

## 2019-02-15 ENCOUNTER — OFFICE VISIT (OUTPATIENT)
Dept: SURGERY | Facility: CLINIC | Age: 74
End: 2019-02-15

## 2019-02-15 VITALS
DIASTOLIC BLOOD PRESSURE: 80 MMHG | SYSTOLIC BLOOD PRESSURE: 120 MMHG | HEIGHT: 71 IN | HEART RATE: 47 BPM | WEIGHT: 172.2 LBS | TEMPERATURE: 98 F | BODY MASS INDEX: 24.11 KG/M2

## 2019-02-15 DIAGNOSIS — Z87.19 HISTORY OF VENTRAL HERNIA REPAIR: Primary | ICD-10-CM

## 2019-02-15 DIAGNOSIS — Z98.890 HISTORY OF VENTRAL HERNIA REPAIR: Primary | ICD-10-CM

## 2019-02-15 PROCEDURE — 99212 OFFICE O/P EST SF 10 MIN: CPT | Performed by: SURGERY

## 2019-02-15 NOTE — PATIENT INSTRUCTIONS
BMI for Adults  Body mass index (BMI) is a number that is calculated from a person's weight and height. In most adults, the number is used to find how much of an adult's weight is made up of fat. BMI is not as accurate as a direct measure of body fat.  How is BMI calculated?  BMI is calculated by dividing weight in kilograms by height in meters squared. It can also be calculated by dividing weight in pounds by height in inches squared, then multiplying the resulting number by 703. Charts are available to help you find your BMI quickly and easily without doing this calculation.  How is BMI interpreted?  Health care professionals use BMI charts to identify whether an adult is underweight, at a normal weight, or overweight based on the following guidelines:  · Underweight: BMI less than 18.5.  · Normal weight: BMI between 18.5 and 24.9.  · Overweight: BMI between 25 and 29.9.  · Obese: BMI of 30 and above.    BMI is usually interpreted the same for males and females.  Weight includes both fat and muscle, so someone with a muscular build, such as an athlete, may have a BMI that is higher than 24.9. In cases like these, BMI may not accurately depict body fat. To determine if excess body fat is the cause of a BMI of 25 or higher, further assessments may need to be done by a health care provider.  Why is BMI a useful tool?  BMI is used to identify a possible weight problem that may be related to a medical problem or may increase the risk for medical problems. BMI can also be used to promote changes to reach a healthy weight.  This information is not intended to replace advice given to you by your health care provider. Make sure you discuss any questions you have with your health care provider.  Document Released: 08/29/2005 Document Revised: 04/27/2017 Document Reviewed: 05/15/2015  [employer] Interactive Patient Education © 2018 [employer]  17-Hydroxycorticosteroids Test  Why am I  having this test?  This is a urine test that determines the level of steroids in the urine. Testing your urine is an indirect way for your health care provider to assess how your adrenal glands are working.  What kind of sample is taken?  A urine sample is collected in a sterile container that is given to you by the lab to use at home before the test.  How do I collect samples at home?  You will be asked to collect urine samples at home over a 24-hour time period. Follow your health care provider's instructions about how to collect the samples.  How do I prepare for this test?  Your health care provider may ask you to avoid the following things before and during the sample collection period:  · Situations that cause you stress.  · Eating licorice.  · Taking certain medicines.    When collecting urine samples over a 24-hour period, make sure you:  · Collect urine only in sterile containers provided to you by the lab.  · Refrigerate all samples until they can be returned to the lab.  · Do not contaminate samples with your stool. Urinate before having a bowel movement to avoid contamination.  · Do not put toilet paper in the urine collection container.    What are the reference ranges?  Reference ranges are considered healthy ranges established after testing a large group of healthy people. Reference ranges may vary among different people, labs, and hospitals. It is your responsibility to obtain your test results. Ask the lab or department performing the test when and how you will receive your results.  · Adults:  ? Male: 3-10 mg in 24 hours or 8.3-27.6 micromoles in a day (SI units).  ? Female: 2-8 mg/24 hours or 5.2-22.1 micromoles in a day (SI units).  ? Elderly: slightly lower than adult values.  · Children:  ? Less than 8 years of age: less than 1.5 mg in 24 hours.  ? 8-12 years of age: less than 4.5 mg in 24 hours.    What do the results mean?  Increased levels may be related to the following:  · Cushing disease  or Cushing syndrome.  · Tumors of the pituitary gland.  · Hyperthyroidism.  · Stress.  · Obesity.    Decreased levels may be related to the following:  · Certain syndromes of the adrenal gland.  · Underactive pituitary gland.  · Erik disease.  · Hypothyroidism.    Talk with your health care provider to discuss your results, treatment options, and if necessary, the need for more tests. Talk with your health care provider if you have any questions about your results.  This information is not intended to replace advice given to you by your health care provider. Make sure you discuss any questions you have with your health care provider.  Document Released: 01/20/2006 Document Revised: 08/21/2017 Document Reviewed: 05/04/2015  [employer]vier Interactive Patient Education © 2018 [employer]

## 2019-02-19 ENCOUNTER — APPOINTMENT (OUTPATIENT)
Dept: PREADMISSION TESTING | Facility: HOSPITAL | Age: 74
End: 2019-02-19

## 2019-02-19 PROBLEM — Z87.19 HISTORY OF VENTRAL HERNIA REPAIR: Status: ACTIVE | Noted: 2019-02-19

## 2019-02-19 PROBLEM — Z98.890 HISTORY OF VENTRAL HERNIA REPAIR: Status: ACTIVE | Noted: 2019-02-19

## 2019-02-20 NOTE — PROGRESS NOTES
Chief Complaint   Patient presents with   • Follow-up     Recheck mass left thigh        HPI  73-year-old man status post bilateral inguinal hernia repairs and repair of a ventral hernia at the site of a robotic port.  Has been doing well with no bulging and no pain.  His urethral valve is scheduled to be repaired by Dr. Damico.  Past Medical History:   Diagnosis Date   • Abdominal pain     no hernias noted- multiple repairs      • Anesthesia complication     states he , cardiac arrest for 2 min. with spinal cord stimulator surgery   • Asthma    • Backache    • Black lung disease (CMS/HCC)    • Cataract    • Constipation    • Coronary artery disease    • DJD (degenerative joint disease)     involving multiple joints   • Dyspnea     with exertion   • Emphysema of lung (CMS/HCC)    • Essential hypertension    • Generalized anxiety disorder    • Hallux valgus     right 2nd toe   • Hearing loss    • Heartburn    • Hemorrhoids    • Incisional hernia with obstruction, without gangrene    • Malignant tumor of prostate (CMS/HCC)    • Mass of left thigh 2018   • MI (myocardial infarction) (CMS/HCC)    • MRSA infection    • Plantar fasciitis     right heel   • PVD (peripheral vascular disease) (CMS/HCC)    • Sleep apnea     using c-pap   • Sleep disorder    • Urinary incontinence        Past Surgical History:   Procedure Laterality Date   • BLADDER SURGERY      AMS urinary control system   • CARDIAC SURGERY      cabg   • CYSTOSCOPY RETROGRADE PYELOGRAM Right 2019    Procedure: Cystoscopy;  Surgeon: D'Amico, Anna M., MD;  Location: [facility];  Service: Urology   • ENDOSCOPY AND COLONOSCOPY  2011    Diverticulosis in sigmoid colon. Hemorrhoids in anus. Edema of rectal mucosa. Multiple biopsies taken. Lipoma in cecum. Multiple biopsies taken   • HERNIA REPAIR  2004    Left inguinal hernioplasty with mesh   • PROSTATECTOMY     • SPINAL CORD STIMULATOR IMPLANT     • TRANSESOPHAGEAL ECHOCARDIOGRAM  ([place])  07/09/2008    without color flow-Normal systolic function with evidence of left ventricular hypertrophy   • TRUNK LESION/CYST EXCISION Left 7/12/2018    Procedure: EXCISION MASS LEFT HIP;  Surgeon: Derik Michelle MD;  Location: [facility];  Service: General   • UMBILICAL HERNIA REPAIR  09/12/2006    Primary umbilical hernioplasty   • VENTRAL HERNIA REPAIR N/A 11/14/2017    Procedure: LAPAROSCOPIC POSSIBLE OPEN VENTRAL/INCISIONAL HERNIA REPAIR WITH MESH;  Surgeon: Derik Michelle MD;  Location: [facility];  Service:          Current Outpatient Medications:   •  acetaminophen (TYLENOL) 650 MG 8 hr tablet, Take 650 mg by mouth Every 8 (Eight) Hours As Needed., Disp: , Rfl:   •  albuterol (PROVENTIL HFA;VENTOLIN HFA) 108 (90 BASE) MCG/ACT inhaler, Inhale 2 puffs Every 4 (Four) Hours As Needed for wheezing., Disp: , Rfl:   •  albuterol (PROVENTIL) (2.5 MG/3ML) 0.083% nebulizer solution, Inhale 2.5 mg 4 (Four) Times a Day., Disp: , Rfl:   •  aluminum hydroxide-mag trisilicate (GAVISCON) 80-20 MG chewable tablet chewable tablet, Chew 1 tablet As Needed., Disp: , Rfl:   •  aspirin 81 MG EC tablet, Take 81 mg by mouth Daily. Last dose 11/7/17, Disp: , Rfl:   •  budesonide (PULMICORT) 0.5 MG/2ML nebulizer solution, Take 0.5 mg by nebulization Daily., Disp: , Rfl: 11  •  bumetanide (BUMEX) 2 MG tablet, Take 2 mg by mouth Daily., Disp: , Rfl: 3  •  carvedilol (COREG) 12.5 MG tablet, Take 12.5 mg by mouth 2 (Two) Times a Day., Disp: , Rfl:   •  clopidogrel (PLAVIX) 75 MG tablet, Take 75 mg by mouth Daily. Last dose 11/7/17, Disp: , Rfl:   •  DULERA 200-5 MCG/ACT inhaler, Inhale 2 puffs 2 (Two) Times a Day., Disp: , Rfl: 11  •  famotidine (PEPCID) 20 MG tablet, Take 20 mg by mouth Daily. Taking at lunch, Disp: , Rfl:   •  isosorbide mononitrate (IMDUR) 30 MG 24 hr tablet, Take 30 mg by mouth Daily., Disp: , Rfl:   •  lisinopril (PRINIVIL,ZESTRIL) 2.5 MG tablet, Take 2.5 mg by mouth 2 (Two) Times a Day., Disp: , Rfl:   •  MUCINEX  600 MG 12 hr tablet, Take 600 mg by mouth 2 (Two) Times a Day., Disp: , Rfl: 4  •  Multiple Vitamins-Minerals (MULTIVITAMIN ADULT PO), Take 1 tablet by mouth Daily., Disp: , Rfl:   •  nitroglycerin (NITROSTAT) 0.4 MG SL tablet, Place 0.4 mg under the tongue Every 5 (Five) Minutes As Needed for chest pain. Take no more than 3 doses in 15 minutes., Disp: , Rfl:   •  O2 (OXYGEN), Inhale 2 L/min Daily., Disp: , Rfl:   •  oxyCODONE-acetaminophen (PERCOCET) 5-325 MG per tablet, Take 1-2 tablets by mouth Every 6 (Six) Hours As Needed for Moderate Pain ., Disp: 30 tablet, Rfl: 0  •  potassium chloride (K-DUR) 10 MEQ CR tablet, Take 10 mEq by mouth Daily. Takes at lunch, Disp: , Rfl:   •  pravastatin (PRAVACHOL) 40 MG tablet, Take 40 mg by mouth Every Night., Disp: , Rfl:   •  PredniSONE 5 MG tablet therapy pack dosepak, Take 1 each by mouth. Take as directed on package instructions., Disp: , Rfl:   •  pregabalin (LYRICA) 150 MG capsule, Take 150 mg by mouth 2 (Two) Times a Day., Disp: , Rfl:   •  SPIRIVA RESPIMAT 2.5 MCG/ACT aerosol solution, Inhale 1 puff 2 (Two) Times a Day., Disp: , Rfl: 11  •  sucralfate (CARAFATE) 1 G tablet, Take 1 g by mouth 2 (Two) Times a Day., Disp: , Rfl:   •  sulfamethoxazole-trimethoprim (BACTRIM DS) 800-160 MG per tablet, Take 1 tablet by mouth 2 (Two) Times a Day., Disp: 30 tablet, Rfl: 0  •  tapentadol (NUCYNTA) 75 MG tablet, Take 75 mg by mouth 4 (Four) Times a Day As Needed., Disp: , Rfl:     Allergies   Allergen Reactions   • Codeine Nausea And Vomiting   • Contrast Dye Rash       Family History   Problem Relation Age of Onset   • Cancer Other    • Diabetes Other    • Heart disease Other    • Hypertension Other        Social History     Socioeconomic History   • Marital status:      Spouse name: Not on file   • Number of children: Not on file   • Years of education: Not on file   • Highest education level: Not on file   Social Needs   • Financial resource strain: Not on file   •  Food insecurity - worry: Not on file   • Food insecurity - inability: Not on file   • Transportation needs - medical: Not on file   • Transportation needs - non-medical: Not on file   Occupational History   • Not on file   Tobacco Use   • Smoking status: Former Smoker     Years: 25.00     Types: Cigarettes     Last attempt to quit: 2011     Years since quittin.1   • Smokeless tobacco: Never Used   Substance and Sexual Activity   • Alcohol use: No   • Drug use: No   • Sexual activity: Defer   Other Topics Concern   • Not on file   Social History Narrative   • Not on file       Review of Systems   Gastrointestinal: Negative for abdominal distention, abdominal pain, anal bleeding, blood in stool, constipation, diarrhea, nausea, rectal pain and vomiting.       Physical Exam   Constitutional: He appears well-developed and well-nourished.   Cardiovascular: Normal rate and regular rhythm.   Pulmonary/Chest: Effort normal and breath sounds normal.   Abdominal: Soft. Bowel sounds are normal. He exhibits no distension and no mass. There is no tenderness. There is no rebound and no guarding. No hernia.   Vitals reviewed.        ASSESSMENT    Angelita was seen today for follow-up.    Diagnoses and all orders for this visit:    History of ventral hernia repair        PLAN    1.  Recheck in 3 months, sooner if he develops abdominal pain or other problems with his hernia repairs.              This document has been electronically signed by Derik Michelle MD on 2019 9:09 PM

## 2019-02-26 ENCOUNTER — APPOINTMENT (OUTPATIENT)
Dept: PREADMISSION TESTING | Facility: HOSPITAL | Age: 74
End: 2019-02-26

## 2019-03-16 ENCOUNTER — HOSPITAL ENCOUNTER (EMERGENCY)
Facility: HOSPITAL | Age: 74
Discharge: HOME | End: 2019-03-16
Attending: EMERGENCY MEDICINE | Admitting: EMERGENCY MEDICINE

## 2019-03-16 VITALS
RESPIRATION RATE: 18 BRPM | TEMPERATURE: 97.4 F | DIASTOLIC BLOOD PRESSURE: 75 MMHG | HEART RATE: 72 BPM | WEIGHT: 170 LBS | OXYGEN SATURATION: 93 % | SYSTOLIC BLOOD PRESSURE: 132 MMHG | HEIGHT: 71 IN | BODY MASS INDEX: 23.8 KG/M2

## 2019-03-16 DIAGNOSIS — R33.9 URINARY RETENTION: ICD-10-CM

## 2019-03-16 DIAGNOSIS — Z97.8 FOLEY CATHETER IN PLACE: ICD-10-CM

## 2019-03-16 DIAGNOSIS — R30.0 DYSURIA: Primary | ICD-10-CM

## 2019-03-16 LAB
BILIRUB UR QL STRIP: NEGATIVE
CLARITY UR: CLEAR
COLOR UR: YELLOW
GLUCOSE UR STRIP-MCNC: NEGATIVE MG/DL
HGB UR QL STRIP.AUTO: NEGATIVE
KETONES UR QL STRIP: NEGATIVE
LEUKOCYTE ESTERASE UR QL STRIP.AUTO: NEGATIVE
NITRITE UR QL STRIP: NEGATIVE
PH UR STRIP.AUTO: 7 [PH] (ref 5–9)
PROT UR QL STRIP: NEGATIVE
SP GR UR STRIP: 1.01 (ref 1–1.03)
UROBILINOGEN UR QL STRIP: NORMAL

## 2019-03-16 PROCEDURE — 96365 THER/PROPH/DIAG IV INF INIT: CPT

## 2019-03-16 PROCEDURE — 51798 US URINE CAPACITY MEASURE: CPT

## 2019-03-16 PROCEDURE — 25010000002 CEFTRIAXONE PER 250 MG: Performed by: EMERGENCY MEDICINE

## 2019-03-16 PROCEDURE — 81003 URINALYSIS AUTO W/O SCOPE: CPT | Performed by: PHYSICIAN ASSISTANT

## 2019-03-16 PROCEDURE — 99284 EMERGENCY DEPT VISIT MOD MDM: CPT

## 2019-03-16 RX ORDER — OXYCODONE AND ACETAMINOPHEN 7.5; 325 MG/1; MG/1
1 TABLET ORAL ONCE
Status: COMPLETED | OUTPATIENT
Start: 2019-03-16 | End: 2019-03-16

## 2019-03-16 RX ORDER — ATROPA BELLADONNA AND OPIUM 16.2; 3 MG/1; MG/1
30 SUPPOSITORY RECTAL ONCE
Status: COMPLETED | OUTPATIENT
Start: 2019-03-16 | End: 2019-03-16

## 2019-03-16 RX ORDER — PHENAZOPYRIDINE HYDROCHLORIDE 200 MG/1
200 TABLET, FILM COATED ORAL 3 TIMES DAILY PRN
Qty: 6 TABLET | Refills: 0 | Status: SHIPPED | OUTPATIENT
Start: 2019-03-16 | End: 2019-03-18

## 2019-03-16 RX ADMIN — ATROPA BELLADONNA AND OPIUM 30 MG: 16.2; 3 SUPPOSITORY RECTAL at 13:52

## 2019-03-16 RX ADMIN — OXYCODONE HYDROCHLORIDE AND ACETAMINOPHEN 1 TABLET: 7.5; 325 TABLET ORAL at 11:12

## 2019-03-16 RX ADMIN — CEFTRIAXONE SODIUM 1 G: 1 INJECTION, POWDER, FOR SOLUTION INTRAMUSCULAR; INTRAVENOUS at 12:56

## 2019-03-16 NOTE — ED PROVIDER NOTES
"Subjective   Pt has urinary control system placed in  (by Dr. DAmico) due to urinary incontinence and now in the ED due to dysuria and difficulty urinating for 2 weeks. Pt has an appointment with Dr. DAmico in 2 days but pt states \"I just can't take the pain anymore.\"         History provided by:  Patient   used: No    Dysuria   Severity:  Moderate  Duration:  2 weeks  Progression:  Worsening  Chronicity:  New  Associated symptoms: no congestion, no cough, no fatigue, no shortness of breath and no vomiting        Review of Systems   Constitutional: Negative for fatigue.   HENT: Negative for congestion.    Respiratory: Negative for cough and shortness of breath.    Gastrointestinal: Negative for vomiting.   Endocrine: Negative for polyuria.   Genitourinary: Positive for dysuria.   Skin: Negative for color change.   Neurological: Negative for syncope.   Psychiatric/Behavioral: Negative for agitation.   All other systems reviewed and are negative.      Past Medical History:   Diagnosis Date   • Abdominal pain     no hernias noted- multiple repairs      • Anesthesia complication     states he , cardiac arrest for 2 min. with spinal cord stimulator surgery   • Asthma    • Backache    • Black lung disease (CMS/HCC)    • Cataract    • Constipation    • Coronary artery disease    • DJD (degenerative joint disease)     involving multiple joints   • Dyspnea     with exertion   • Emphysema of lung (CMS/HCC)    • Essential hypertension    • Generalized anxiety disorder    • Hallux valgus     right 2nd toe   • Hearing loss    • Heartburn    • Hemorrhoids    • Incisional hernia with obstruction, without gangrene    • Malignant tumor of prostate (CMS/[facility])    • Mass of left thigh 2018   • MI (myocardial infarction) (CMS/HCC)    • MRSA infection    • Plantar fasciitis     right heel   • PVD (peripheral vascular disease) (CMS/[facility])    • Sleep apnea     using c-pap   • Sleep disorder    • Urinary " incontinence        Allergies   Allergen Reactions   • Codeine Nausea And Vomiting   • Contrast Dye Rash       Past Surgical History:   Procedure Laterality Date   • BLADDER SURGERY      AMS urinary control system   • CARDIAC SURGERY      cabg   • CYSTOSCOPY RETROGRADE PYELOGRAM Right 2019    Procedure: Cystoscopy;  Surgeon: D'Amico, Anna M., MD;  Location: [facility];  Service: Urology   • ENDOSCOPY AND COLONOSCOPY  2011    Diverticulosis in sigmoid colon. Hemorrhoids in anus. Edema of rectal mucosa. Multiple biopsies taken. Lipoma in cecum. Multiple biopsies taken   • HERNIA REPAIR  2004    Left inguinal hernioplasty with mesh   • PROSTATECTOMY     • SPINAL CORD STIMULATOR IMPLANT     • TRANSESOPHAGEAL ECHOCARDIOGRAM ([place])  2008    without color flow-Normal systolic function with evidence of left ventricular hypertrophy   • TRUNK LESION/CYST EXCISION Left 2018    Procedure: EXCISION MASS LEFT HIP;  Surgeon: Derik Michelle MD;  Location: [facility];  Service: General   • UMBILICAL HERNIA REPAIR  2006    Primary umbilical hernioplasty   • VENTRAL HERNIA REPAIR N/A 2017    Procedure: LAPAROSCOPIC POSSIBLE OPEN VENTRAL/INCISIONAL HERNIA REPAIR WITH MESH;  Surgeon: Derik Michelle MD;  Location: [facility];  Service:        Family History   Problem Relation Age of Onset   • Cancer Other    • Diabetes Other    • Heart disease Other    • Hypertension Other        Social History     Socioeconomic History   • Marital status:      Spouse name: Not on file   • Number of children: Not on file   • Years of education: Not on file   • Highest education level: Not on file   Tobacco Use   • Smoking status: Former Smoker     Years: 25.00     Types: Cigarettes     Last attempt to quit:      Years since quittin.2   • Smokeless tobacco: Never Used   Substance and Sexual Activity   • Alcohol use: No   • Drug use: No   • Sexual activity: Defer           Objective   Physical Exam    Constitutional: He is oriented to person, place, and time. He appears well-developed and well-nourished.   HENT:   Head: Normocephalic.   Right Ear: Hearing normal.   Left Ear: Hearing normal.   Nose: Nose normal.   Eyes: Conjunctivae, EOM and lids are normal.   Neck: Trachea normal and full passive range of motion without pain.   Cardiovascular: Regular rhythm, S1 normal, S2 normal, normal heart sounds and normal pulses.   Pulmonary/Chest: Effort normal and breath sounds normal.   Abdominal: Normal appearance and bowel sounds are normal.   Neurological: He is alert and oriented to person, place, and time. He is not disoriented.   Skin: Skin is warm and dry. He is not diaphoretic.   Psychiatric: He has a normal mood and affect. His speech is normal and behavior is normal. Thought content normal.   Nursing note and vitals reviewed.      Procedures         Labs Reviewed   URINALYSIS W/ CULTURE IF INDICATED - Normal    Narrative:     Urine microscopic not indicated.       No orders to display           ED Course      10:50A  Attempted to deactivated the urinary control system several times.     10:58A  Discussed with Dr. DAmico about unsuccessful attempt to deactivate urinary control system. She will come to the ED and evaluate the pt. All questions addressed at this time.     eKASPER #[id number] reviewed      12:30P  Dr. DAmico in the ED and evaluated pt. Advised to order IV ABX and B&O suppository in the ED.     12:36P  Discussed with Dr. Fraire (supervising physician) about pt's condition. Since pt currently has a prescription for Nucynta (prescribed 2/18 for 30 days), he advised to prescribe Pyridium for 2 days and to f/u with PCP on Monday for more pain medications if necessary. All questions addressed at this time.     12:46P  Rechecked pt and pt reports he still has several pills of Nucynta for pain. All questions addressed at this time.               MDM      Final diagnoses:   Dysuria   Urinary retention    Powell catheter in place            Ghada Youngblood PA-C  03/16/19 0740

## 2019-03-18 ENCOUNTER — DOCUMENTATION (OUTPATIENT)
Dept: UROLOGY | Facility: HOSPITAL | Age: 74
End: 2019-03-18

## 2019-03-18 NOTE — PROGRESS NOTES
.    Date of Admission: (Not on file)  Date of Consult:03/16/19  Primary Care Physician: López Alarcon MD  Referring Physician:  ER     Chief Complaint/Reason for Consultation:   device pain, scrotal abnormality and incomplete bladder emptying     HPI:Pt is well known to me.  He has an AUS which has been causing difficulty to the patient for the last month.  He was slated for an intervention last month but had to have a cartoid and stent placement performed.  He comes in today on ABX, but having difficulty opening his sphincter and he is afraid the device has popped through the skin.  ER attempted to deactivate the device without success - I was asked to evaluate patient.     Past Medical History:  has a past medical history of Abdominal pain, Anesthesia complication, Asthma, Backache, Black lung disease (CMS/HCC), Cataract, Constipation, Coronary artery disease, DJD (degenerative joint disease), Dyspnea, Emphysema of lung (CMS/HCC), Essential hypertension, Generalized anxiety disorder, Hallux valgus, Hearing loss, Heartburn, Hemorrhoids, Incisional hernia with obstruction, without gangrene, Malignant tumor of prostate (CMS/[facility]), Mass of left thigh (8/1/2018), MI (myocardial infarction) (CMS/HCC) (2011), MRSA infection, Plantar fasciitis, PVD (peripheral vascular disease) (CMS/[facility]), Sleep apnea, Sleep disorder, and Urinary incontinence.    Past Surgical History:  has a past surgical history that includes endoscopy and colonoscopy (09/19/2011); transesophageal echocardiogram (bal) (07/09/2008); Prostatectomy; Hernia repair (08/17/2004); Umbilical hernia repair (09/12/2006); Cardiac surgery (2011); Bladder surgery; Spinal cord stimulator implant; Cystoscopy (Right, 1/28/2019); EXCISION MASS LEFT HIP (Left, 7/12/2018); and LAPAROSCOPIC POSSIBLE OPEN VENTRAL/INCISIONAL HERNIA REPAIR WITH MESH (N/A, 11/14/2017).    Family History: family history includes Cancer in his other; Diabetes in his other; Heart disease  in his other; Hypertension in his other.    Social History:  reports that he quit smoking about 8 years ago. His smoking use included cigarettes. He quit after 25.00 years of use. he has never used smokeless tobacco. He reports that he does not drink alcohol or use drugs.    Allergies:   Allergies   Allergen Reactions   • Codeine Nausea And Vomiting   • Contrast Dye Rash       Medications:   Ventolin  lisinopril  K-Dur  Dulera  Spiriva  Bumex  Aspirin  Plavix  Coreg  Famotidine  Isosorbide  Pravastatin  Lyrica  Nucynta  Bactrim       Review of Systems:  Review of Systems   HENT: Positive for congestion and hearing loss.    Eyes: Negative.    Respiratory: Negative.    Cardiovascular: Negative.    Gastrointestinal: Positive for abdominal distention.   Genitourinary: Positive for difficulty urinating (He does not feel like the sphincter functions properly and therefore he cannot urinarte well. ), dysuria (The cuckleburr feeling is gone, but there is irritation on urination since his surgery after a catheter was attempted to be passed without opening the sphincter. ), frequency (trying to go often because he feels like he cannot keep his sphincter open long enough to urinate, however he does not have the sensation to urinate often. ), genital sores (There is a blister forming at the skin over the scrotal pump ), penile pain and testicular pain (scrotal pump difficult to use secondary to pain ).   Musculoskeletal: Positive for back pain and joint swelling.   Skin: Positive for wound (area on scrotum is irritated and there appears to be a blood blister on the skin ).   Neurological: Positive for weakness.   Hematological: Bruises/bleeds easily (patient is on Anticoagulation therapy ).          Physical Exam:      Physical Exam   Constitutional: He is oriented to person, place, and time. He appears well-developed and well-nourished. He appears distressed (patient appears uncomfortable when touching the scrotum ).   HENT:    Head: Normocephalic and atraumatic.   Eyes: EOM are normal.   Neck: Normal range of motion. Neck supple.   Cardiovascular: Normal rate, regular rhythm, normal heart sounds and intact distal pulses.   Pulmonary/Chest: Effort normal and breath sounds normal.   Abdominal: Soft. Bowel sounds are normal. There is tenderness (patient has mild tenderness over the pubic area with DEEP palpation ).   Genitourinary: Penile tenderness (mild tenderness in the mid to proximal shaft ) present.   Genitourinary Comments: Prostate fossa empty   Scrotum with tenderness surrounding the pump and a blood blister over the same area   The tubing appears to be intact and no tenderness toward or surrounding the inguinal area to the reservoir.    Musculoskeletal: Normal range of motion.   Neurological: He is alert and oriented to person, place, and time.   Skin: Skin is warm and dry.   Psychiatric: He has a normal mood and affect.         Results Reviewed:  I reviewed the patient's new clinical results.  I reviewed the patient's new imaging results.  I reviewed the patient's other test results.  I have reviewed the patients PVR  524cc      UA negative        Assessment:   device malfunction   Urinary retention   CAD - recent coronary artery stenting   COPD  GERD          Patients device was deactivated.  He was allowed to void.  His PVR was noted to 260cc.  After attempting to explain to the patient that he would not need to use his device, but rather a diaper.  He did not seem to understand, so I decided to place a jones catheter.  The pelvic region and perineum was prepped and draped.  A 12 fr coude catheter was gently placed without difficulty.  The residual obtained was 150cc of clear urine. A leg bag was attached and he and his daughter were given information about how to care for the catheter and a larger  bag was given as well.     We then discussed the next step in in his treatment.  He will need to have surgery to scope the  urethral and then remove and possible replace the Artifical Urinary Sphincter.  If infected or eroded, the device will need to be removed completely and after healing 6 months he may return to replace the device.  If there is no infection or erosion, then the device can be replaced at the time of removal.   The risks and benefits of both were discussed and questions were answered.  He agrees something needs to be done - sooner rather then later.  I will need to talk to his interventional Cardiologist - Dr Madrigal about the anticoagulation situation since he has a new stent. I will also need to coordinate with the device representative for the surgery date.     Recommendations:  Device deactivation   Powell catheter  Continued ABX  Pain medication as needed  Schedule surgery after obtaining cardiology clearance and information about anticoagulation status and representative's availability

## 2019-03-20 ENCOUNTER — PREP FOR SURGERY (OUTPATIENT)
Dept: OTHER | Facility: HOSPITAL | Age: 74
End: 2019-03-20

## 2019-03-20 DIAGNOSIS — T83.718D: Primary | ICD-10-CM

## 2019-03-20 PROBLEM — I65.29 CAROTID STENOSIS: Status: ACTIVE | Noted: 2019-02-25

## 2019-03-20 PROBLEM — R94.39 ABNORMAL CARDIOVASCULAR STRESS TEST: Status: ACTIVE | Noted: 2019-03-05

## 2019-03-20 RX ORDER — CLINDAMYCIN PHOSPHATE 900 MG/50ML
900 INJECTION INTRAVENOUS
Status: CANCELLED | OUTPATIENT
Start: 2019-03-25

## 2019-03-21 ENCOUNTER — OFFICE VISIT (OUTPATIENT)
Dept: CARDIOLOGY | Facility: CLINIC | Age: 74
End: 2019-03-21

## 2019-03-21 VITALS
WEIGHT: 170.4 LBS | BODY MASS INDEX: 23.85 KG/M2 | SYSTOLIC BLOOD PRESSURE: 112 MMHG | OXYGEN SATURATION: 93 % | HEART RATE: 66 BPM | DIASTOLIC BLOOD PRESSURE: 60 MMHG | HEIGHT: 71 IN

## 2019-03-21 DIAGNOSIS — Z01.810 PREOP CARDIOVASCULAR EXAM: Primary | ICD-10-CM

## 2019-03-21 DIAGNOSIS — C61 PROSTATE CANCER (HCC): ICD-10-CM

## 2019-03-21 DIAGNOSIS — I25.10 CORONARY ARTERY DISEASE INVOLVING NATIVE CORONARY ARTERY OF NATIVE HEART WITHOUT ANGINA PECTORIS: ICD-10-CM

## 2019-03-21 DIAGNOSIS — J44.9 STAGE 4 VERY SEVERE COPD BY GOLD CLASSIFICATION (HCC): ICD-10-CM

## 2019-03-21 DIAGNOSIS — Z95.1 HISTORY OF CORONARY ARTERY BYPASS SURGERY: ICD-10-CM

## 2019-03-21 DIAGNOSIS — G47.33 OBSTRUCTIVE SLEEP APNEA SYNDROME: ICD-10-CM

## 2019-03-21 DIAGNOSIS — J60 BLACK LUNG DISEASE (HCC): ICD-10-CM

## 2019-03-21 DIAGNOSIS — T83.69XD: ICD-10-CM

## 2019-03-21 PROBLEM — T85.698A MALFUNCTION OF DEVICE: Status: ACTIVE | Noted: 2019-03-21

## 2019-03-21 PROCEDURE — 99215 OFFICE O/P EST HI 40 MIN: CPT | Performed by: NURSE PRACTITIONER

## 2019-03-21 PROCEDURE — 93000 ELECTROCARDIOGRAM COMPLETE: CPT | Performed by: INTERNAL MEDICINE

## 2019-03-21 NOTE — PROGRESS NOTES
Subjective:     preoperative cardiac evaluation (chief complaint )    Patient Care Team:  López Alarcon MD as PCP - General (Family Medicine)    History of Present Illness  The patient is a 74-year-old  gentleman (resident of [facility]) who presents at the request of Dr. Anna D'Amico regarding need for cardiology input regarding the bridging of antiplatelet therapy given an urgent need for operative procedure regarding the malfunction of artificial urinary sphincter device.    The patient has received cardiovascular services in the [facility].   He has history of coronary artery bypass grafting in [place] in 2011 with LIMA to LAD; SVG (sequential) to distal RCA and terminal circumflex;  (R) carotid endarterectomy on 02/25/2019;   PCI to proximal OM1 (Resolute 2.75/14 mm stent) on 03/07/2019.    In addition, the patient has concurrent medical history inclusive of gold stage IV COPD/black lung with as needed home oxygen therapy, as well as obstructive sleep apnea reporting compliance with CPAP equipment.    The patient has history of prostate cancer status post prostatectomy in 2008 at which time he developed urinary incontinence in the postoperative state this undergoing implantation of artificial urinary sphincter device.  The patient presented to [facility] emergency department on 3/16/2019 regarding  device pain with scrotal abnormality and incomplete bladder emptying.  He received emergency department consultation with Dr. Anna D'Amico.  Following evaluation, device was deactivated with placement of Powell catheter per Dr. D Amico with plans for continuation of antibiotic therapy and pain management as needed.  In addition, there was discussion regarding the recommendation from Dr. D'Amico standpoint to proceed with operative procedure for removal of the artificial urinary sphincter device with the possibility of replacement.  If in fact the device was found to be  infected or erosion present, the device would need to be removed and following a healing.  Of 6 months consideration for replacement of device.    As the patient underwent placement of drug-eluting stent to coronary artery (as outlined above) on 3/7/2019, the patient was found to be appropriate for bridging of his antiplatelet therapy.    Review of Systems   Constitution: Negative for chills and fever.   Cardiovascular: Positive for dyspnea on exertion. Negative for chest pain, claudication, cyanosis, irregular heartbeat, leg swelling, near-syncope, orthopnea, palpitations, paroxysmal nocturnal dyspnea and syncope.   Respiratory: Negative for cough, hemoptysis, sleep disturbances due to breathing, snoring, sputum production and wheezing.    Hematologic/Lymphatic: Negative for bleeding problem. Does not bruise/bleed easily.   Musculoskeletal: Negative for falls.   Gastrointestinal: Negative for bloating and abdominal pain.   Genitourinary: Bladder incontinence: as per HPI.   Neurological: Negative for dizziness, light-headedness and vertigo.   Psychiatric/Behavioral: Negative for altered mental status. The patient is not nervous/anxious.      Past Medical History:   Diagnosis Date   • MI (myocardial infarction) (CMS/HCC)    • Mass of left thigh 2018   • Abdominal pain     no hernias noted- multiple repairs      • Anesthesia complication     states he , cardiac arrest for 2 min. with spinal cord stimulator surgery   • Asthma    • Backache    • Black lung disease (CMS/HCC)    • Cataract    • Constipation    • Coronary artery disease    • DJD (degenerative joint disease)     involving multiple joints   • Dyspnea     with exertion   • Emphysema of lung (CMS/HCC)    • Essential hypertension    • Generalized anxiety disorder    • Hallux valgus     right 2nd toe   • Hearing loss    • Heartburn    • Hemorrhoids    • Incisional hernia with obstruction, without gangrene    • Malignant tumor of prostate (CMS/HCC)     • MRSA infection    • Plantar fasciitis     right heel   • PVD (peripheral vascular disease) (CMS/HCC)    • Sleep apnea     using c-pap   • Sleep disorder    • Urinary incontinence    ,   Past Surgical History:   Procedure Laterality Date   • HERNIA REPAIR  2004    Left inguinal hernioplasty with mesh   • UMBILICAL HERNIA REPAIR  2006    Primary umbilical hernioplasty   • TRANSESOPHAGEAL ECHOCARDIOGRAM ([place])  2008    without color flow-Normal systolic function with evidence of left ventricular hypertrophy   • CARDIAC SURGERY      cabg   • ENDOSCOPY AND COLONOSCOPY  2011    Diverticulosis in sigmoid colon. Hemorrhoids in anus. Edema of rectal mucosa. Multiple biopsies taken. Lipoma in cecum. Multiple biopsies taken   • VENTRAL HERNIA REPAIR N/A 2017    Procedure: LAPAROSCOPIC POSSIBLE OPEN VENTRAL/INCISIONAL HERNIA REPAIR WITH MESH;  Surgeon: Derik Michelle MD;  Location: [facility];  Service:    • TRUNK LESION/CYST EXCISION Left 2018    Procedure: EXCISION MASS LEFT HIP;  Surgeon: Derik Michelle MD;  Location: [facility];  Service: General   • CYSTOSCOPY RETROGRADE PYELOGRAM Right 2019    Procedure: Cystoscopy;  Surgeon: D'Amico, Anna M., MD;  Location: [facility];  Service: Urology   • BLADDER SURGERY      AMS urinary control system   • PROSTATECTOMY     • SPINAL CORD STIMULATOR IMPLANT     ,   Family History   Problem Relation Age of Onset   • Cancer Other    • Diabetes Other    • Heart disease Other    • Hypertension Other    ,   Social History     Tobacco Use   • Smoking status: Former Smoker     Years: 25.00     Types: Cigarettes     Last attempt to quit:      Years since quittin.2   • Smokeless tobacco: Never Used   Substance Use Topics   • Alcohol use: No   • Drug use: No     Codeine and Contrast dye    Current Outpatient Medications   Medication Sig Dispense Refill   • albuterol (PROVENTIL HFA;VENTOLIN HFA) 108 (90 BASE) MCG/ACT inhaler Inhale 2 puffs Every 4  (Four) Hours As Needed for wheezing.     • albuterol (PROVENTIL) (2.5 MG/3ML) 0.083% nebulizer solution Inhale 2.5 mg 4 (Four) Times a Day.     • aspirin 81 MG EC tablet Take 81 mg by mouth Daily. Last dose 11/7/17     • bumetanide (BUMEX) 2 MG tablet Take 2 mg by mouth Daily.  3   • carvedilol (COREG) 12.5 MG tablet Take 12.5 mg by mouth Daily.     • clopidogrel (PLAVIX) 75 MG tablet Take 75 mg by mouth Daily. Last dose 11/7/17     • DULERA 200-5 MCG/ACT inhaler Inhale 2 puffs 2 (Two) Times a Day.  11   • famotidine (PEPCID) 20 MG tablet Take 20 mg by mouth Daily. Taking at lunch     • isosorbide mononitrate (IMDUR) 30 MG 24 hr tablet Take 30 mg by mouth Daily.     • lisinopril (PRINIVIL,ZESTRIL) 2.5 MG tablet Take 2.5 mg by mouth 2 (Two) Times a Day.     • Multiple Vitamins-Minerals (MULTIVITAMIN ADULT PO) Take 1 tablet by mouth Daily.     • nitroglycerin (NITROSTAT) 0.4 MG SL tablet Place 0.4 mg under the tongue Every 5 (Five) Minutes As Needed for chest pain. Take no more than 3 doses in 15 minutes.     • O2 (OXYGEN) Inhale 2 L/min Daily.     • potassium chloride (K-DUR) 10 MEQ CR tablet Take 10 mEq by mouth Daily. Takes at lunch     • pravastatin (PRAVACHOL) 40 MG tablet Take 40 mg by mouth Every Night.     • pregabalin (LYRICA) 150 MG capsule Take 150 mg by mouth 2 (Two) Times a Day.     • SPIRIVA RESPIMAT 2.5 MCG/ACT aerosol solution Inhale 1 puff 2 (Two) Times a Day.  11   • sucralfate (CARAFATE) 1 G tablet Take 1 g by mouth 2 (Two) Times a Day.     • tapentadol (NUCYNTA) 75 MG tablet Take 75 mg by mouth 4 (Four) Times a Day As Needed.     • oxyCODONE-acetaminophen (PERCOCET) 5-325 MG per tablet Take 1-2 tablets by mouth Every 6 (Six) Hours As Needed for Moderate Pain . 30 tablet 0     No current facility-administered medications for this visit.      Objective:     Vitals:    03/21/19 1306   BP: 112/60   BP Location: Left arm   Patient Position: Sitting   Cuff Size: Adult   Pulse: 66   SpO2: 93%   Weight:  "77.3 kg (170 lb 6.4 oz)   Height: 180.3 cm (71\")     Wt Readings from Last 3 Encounters:   03/21/19 77.3 kg (170 lb 6.4 oz)   03/16/19 77.1 kg (170 lb)   02/15/19 78.1 kg (172 lb 3.2 oz)       Physical Exam   Constitutional: He is oriented to person, place, and time. No distress.   HENT:   Head: Normocephalic and atraumatic.   Eyes: No scleral icterus.   Neck: No JVD present.   Cardiovascular: Normal rate, regular rhythm, S1 normal and S2 normal.   Murmur heard.  High-pitched blowing holosystolic murmur is present with a grade of 1/6 at the apex.  Pulses:       Radial pulses are 2+ on the right side, and 2+ on the left side.   Abdominal: He exhibits no distension. There is no tenderness.   Genitourinary:   Genitourinary Comments: Powell bag noted with clear yellow drainage   Musculoskeletal: He exhibits no edema or tenderness.   Neurological: He is alert and oriented to person, place, and time.   Skin: Skin is warm and dry. He is not diaphoretic.   Surgical scar (R) carotid noted to be healing     Psychiatric: He has a normal mood and affect. His behavior is normal. Judgment and thought content normal.   Vitals reviewed.    Flowsheet Rows      First Filed Value   Admission Height  180.3 cm (71\") Documented at 03/21/2019 1306   Admission Weight  77.3 kg (170 lb 6.4 oz) Documented at 03/21/2019 1306        Data Reviewed:  Electrocardiogram: 03/21/2019    Transthoracic Echocardiogram: 02/21/2019  Findings       Left Ventricle:  The left ventricular chamber size is normal. Mild concentric left  ventricular hypertrophy is observed. There is normal left ventricular  systolic function. The estimated ejection fraction is 60-65%. Normal  left ventricular diastolic filling is observed.     Left Atrium:  The left atrium is mildly dilated.     Right Ventricle:  The right ventricle is slightly dilated.  The right ventricular global  systolic function is normal.     Right Atrium:  The right atrium is mildly dilated.      Aortic " Valve:  The aortic valve leaflets are mildly thickened. Mild aortic leaflet  calcification is visualized. There is no evidence of aortic stenosis.  There is a trace of aortic regurgitation.     Mitral Valve:  There is no evidence of mitral valve prolapse. There is a trace of  mitral regurgitation. There is no evidence of mitral stenosis.     Tricuspid Valve:  The tricuspid valve leaflets are not thickened. Tricuspid valve leaflet  mobility appears normal. There is mild tricuspid regurgitation. The  right ventricular systolic pressure is estimated to be 40-45 mmHg.      Pulmonic Valve:  The pulmonic valve appears normal. There is no evidence of pulmonic  regurgitation. There is no pulmonic stenosis.      Pericardium:  There is no pericardial effusion.     Aorta:  There is no dilatation of the ascending aorta. There is no dilatation of  the aortic root.     Venous:  The inferior vena cava appears normal in size.     Conclusions  Normal left ventricular systolic function with estimated EF=60-65%. Mild  distal inferior/inferoseptal hypokinesis.   Mild concentric LVH.   Normal diastolic filling pattern.   Mildly dilated left atrium.   Slightly dilated right ventricle. Normal right ventricular systolic  function.  Mild aortic leaflet calcification. Trace aortic regurgitation.   Mild tricuspid regurgitation.   Estimated RVSP=40-45 mmHg.     Myocardial Perfusion Scan:02/22/2019  Nuclear Perfusion Findings:   Raw images showed mild soft tissue attenuation, but no evidence of motion artifact. Review of the myocardial tomographic slices demonstrated a moderate size, moderate intensity, fixed inferior wall perfusion defect secondary to prior infarction, as well   as a small, moderate intensity, reversible lateral wall perfusion defect suggestive of ischemia. Gated images revealed low normal left ventricular systolic function with a calculated ejection fraction of 54% and moderate inferior wall hypokinesis. The   patient's  ejection fraction improved to 61% at peak stress. There was no evidence of transient ischemic dilatation.    IMPRESSION:   1. Small, moderate intensity, reversible lateral wall perfusion defect suggestive of ischemia. Moderate size, moderate intensity, fixed inferior wall perfusion defect secondary to prior infarction.  2. Calculated EF=54% with moderate inferior wall hypokinesis.  3. Per the ACC stress test guidelines, this is an intermediate-risk study.    Left Heart Catheterization: 03/07/2019  FINDINGS  HEMODYNAMICS:  Opening aortic pressure was 179/67. Closing aortic pressure was 174/64.  Left ventricular pressure was 168/2 and left ventricular end-diastolic pressure  was 19 mmHg. There was no gradient upon pullback across the aortic valve.    CORONARY ANATOMY:  The left main is a 4.5 mm diameter vessel that bifurcates distally into the LAD   and left circumflex artery. There is 20% to 30% stenosis in the distal left main.  Good reflux of contrast was observed from the left main ostium.    The LAD is a moderate-size vessel that courses along the interventricular groove,  tapering mildly in its distal segment. There is diffuse disease with up to 60-70%  stenosis observed in the proximal LAD. Competitive flow was observed to the  mid-distal LAD from the patient's LIMA graft. The LAD gives rise to 2 small   diagonal branches, both of which appeared free of significant disease.    The circumflex is a moderate-size vessel that gives rise to a high branching,  bifurcating OM branch before continuing in the AV groove. There was a tubular  80-90% stenosis in the proximal segment of the OM branch. Following PCI   as described above, there was 0% residual stenosis with no evidence of  complications.     The RCA is a dominant vessel that bifurcates distally into the PDA and PL  branch. There is diffuse calcified disease in the mid RCA with up to   90-95% stenosis. Competitive flow was observed to the distal RCA from   the  patient's saphenous vein graft.    GRAFT STUDY:  The LIMA to LAD is widely patent throughout its course. The graft retrogradely  fills a significant portion of the mid LAD, as well as the diagonal branches. The  native LAD distal to the graft insertion contains no significant disease.    The sequential saphenous vein graft to the distal RCA and terminal circumflex  is patent with a focal 40% stenosis observed in the distal body of the graft. The  native vessels distal to the graft insertions contained mild diffuse disease.    LEFT VENTRICULOGRAM:  Normal left ventricular systolic function with an estimated ejection fraction of   55% and mild inferior wall hypokinesis. There was no evidence of significant   mitral regurgitation. The aortic root is normal size.    CONCLUSIONS  1. Successful PCI to the proximal OM1 using a Resolute 2.75/14 mm drug-eluting  stent. Preprocedure 80-90% stenosis, postprocedure 0% residual stenosis.   MILDRED-3 flow was present preprocedure and postprocedure. Type B lesion. No  evidence of complications.  2. Widely patent LIMA to LAD. Patent sequential saphenous vein graft to the distal  RCA and terminal circumflex.  3. Normal left ventricular systolic function with estimated ejection fraction of 55%  and mild inferior wall hypokinesis.  4. Borderline elevated LVEDP.    Labs:   Glucose   Date Value Ref Range Status   01/28/2019 106 (H) 60 - 100 mg/dL Final     BUN   Date Value Ref Range Status   01/28/2019 26 (H) 7 - 21 mg/dL Final     Creatinine   Date Value Ref Range Status   01/28/2019 0.94 0.70 - 1.30 mg/dL Final     Sodium   Date Value Ref Range Status   01/28/2019 139 137 - 145 mmol/L Final     Potassium   Date Value Ref Range Status   01/28/2019 3.6 3.5 - 5.1 mmol/L Final     Chloride   Date Value Ref Range Status   01/28/2019 102 95 - 110 mmol/L Final     CO2   Date Value Ref Range Status   01/28/2019 33.0 (H) 22.0 - 31.0 mmol/L Final     Calcium   Date Value Ref Range Status    01/28/2019 9.2 8.4 - 10.2 mg/dL Final     eGFR Non  Amer   Date Value Ref Range Status   01/28/2019 79 42 - 98 mL/min/1.73 Final     BUN/Creatinine Ratio   Date Value Ref Range Status   01/28/2019 27.7 (H) 7.0 - 25.0 Final     Anion Gap   Date Value Ref Range Status   01/28/2019 4.0 (L) 5.0 - 15.0 mmol/L Final     WBC   Date Value Ref Range Status   11/08/2017 7.86 3.20 - 9.80 10*3/mm3 Final     RBC   Date Value Ref Range Status   11/08/2017 4.70 4.37 - 5.74 10*6/mm3 Final     Hemoglobin   Date Value Ref Range Status   11/08/2017 14.4 13.7 - 17.3 g/dL Final     Hematocrit   Date Value Ref Range Status   11/08/2017 43.0 39.0 - 49.0 % Final     MCV   Date Value Ref Range Status   11/08/2017 91.5 80.0 - 98.0 fL Final     MCH   Date Value Ref Range Status   11/08/2017 30.6 26.5 - 34.0 pg Final     MCHC   Date Value Ref Range Status   11/08/2017 33.5 31.5 - 36.3 g/dL Final     RDW   Date Value Ref Range Status   11/08/2017 16.0 (H) 11.5 - 14.5 % Final     RDW-SD   Date Value Ref Range Status   11/08/2017 53.1 (H) 35.1 - 43.9 fl Final     MPV   Date Value Ref Range Status   11/08/2017 11.0 8.0 - 12.0 fL Final     Platelets   Date Value Ref Range Status   11/08/2017 202 150 - 450 10*3/mm3 Final     Neutrophil %   Date Value Ref Range Status   11/08/2017 70.3 37.0 - 80.0 % Final     Lymphocyte %   Date Value Ref Range Status   11/08/2017 19.5 10.0 - 50.0 % Final     Monocyte %   Date Value Ref Range Status   11/08/2017 6.9 0.0 - 12.0 % Final     Eosinophil %   Date Value Ref Range Status   11/08/2017 2.7 0.0 - 7.0 % Final     Basophil %   Date Value Ref Range Status   11/08/2017 0.3 0.0 - 2.0 % Final     Immature Grans %   Date Value Ref Range Status   11/08/2017 0.3 0.0 - 0.5 % Final     Neutrophils, Absolute   Date Value Ref Range Status   11/08/2017 5.54 2.00 - 8.60 10*3/mm3 Final     Lymphocytes, Absolute   Date Value Ref Range Status   11/08/2017 1.53 0.60 - 4.20 10*3/mm3 Final     Monocytes, Absolute   Date  Value Ref Range Status   11/08/2017 0.54 0.00 - 0.90 10*3/mm3 Final     Eosinophils, Absolute   Date Value Ref Range Status   11/08/2017 0.21 0.00 - 0.70 10*3/mm3 Final     Basophils, Absolute   Date Value Ref Range Status   11/08/2017 0.02 0.00 - 0.20 10*3/mm3 Final     Immature Grans, Absolute   Date Value Ref Range Status   11/08/2017 0.02 0.00 - 0.02 10*3/mm3 Final     The following portions of the patient's history were reviewed and updated as appropriate: allergies, current medications, problem list,  past medical history, surgical history, family history and social history.    Recent images independently reviewed.    Available laboratory values reviewed.    Assessment:      Diagnosis Plan   1. Preop cardiovascular exam  ECG 12 Lead   2. Coronary artery disease involving native coronary artery of native heart without angina pectoris     3. History of coronary artery bypass surgery     4. Prostate cancer (CMS/[facility])     5.  (genitourinary) device, implant, or graft infection or inflammation, subsequent encounter     6. Stage 4 very severe COPD by GOLD classification (CMS/[facility])     7. Black lung disease (CMS/[facility])     8. Obstructive sleep apnea syndrome          Plan:     The patient presents with significant history regarding cardiovascular disease including that of coronary artery bypass graft in 2011 for which he underwent left heart catheterization on 3/7/2019 with subsequent findings of 80-90% stenosis in the proximal segment of the obtuse marginal 1 artery thus undergoing percutaneous coronary intervention (Resolute 2.75 x 14 mm stent).  Left cardiac catheterization demonstrated evidence of widely patent LIMA to LAD and saphenous vein graft to the distal RCA and terminal circumflex.  The patient presents without symptoms suggestive of angina.  The patient presents with preserved left ventricular ejection fraction and is without clinical evidence of hypervolemia; he is well perfused and hemodynamically  stable.    The patient presents with recent findings of malfunction regarding artificial urinary sphincter device for which requires operative procedure by Dr. Anna D Amico.    As the patient underwent coronary artery stent placement on 3/7/2019, he will require bridging of antiplatelet therapy.    The patient has been instructed to discontinue the use of aspirin and Plavix beginning on the morning of 2/22/2019, as he has already taken his DAPT on 2/21/2019.    The patient will present to the emergency room registration area at approximately 9 AM on 3/23/2019 for the purpose of hospital admission for the bridging of antiplatelet therapy.    Upon admission, the patient will need to receive:  -The patient has been instructed to bring all of his medication bottles to the hospital with him for review by hospitalist team admitting physician  -The patient has been instructed to bring his CPAP equipment with him for years during hours of sleep  -Laboratory diagnostics as determined by admitting physician, including that of CBC  -Integrilin 180 mcg/kg intravenous bolus  -Integrilin infusion at 2 mcg/kg/min  - The interval of recommended DAPT to surgery: 5 days  - Integrilin infusion to be discontinued approximately 8 hours prior to operative procedure, given inhibition of platelet aggregation approximately 4 hours after infusion discontinuation to be less than 50%, as well as a 1.4 times bleeding time prolongation 6 hours after infusion discontinuation.  - Recommendation for resumption of aspirin 81 mg plus Plavix 75 mg daily to be initiated morning following procedure  -The above impression and plan has been presented and agreed upon by Dr. Jennings, interventional cardiologist at [facility].    The need for hospital admission, as outlined above, has been discussed with hospitalist (Dr Ashley), as well as notification to bed control for admission to [facility],.         This document has been  electronically signed by JOJO Mccormick on March 21, 2019 6:08 PM

## 2019-03-22 ENCOUNTER — HOSPITAL ENCOUNTER (INPATIENT)
Facility: HOSPITAL | Age: 74
LOS: 4 days | Discharge: HOME HEALTH | End: 2019-03-26
Attending: HOSPITALIST | Admitting: INTERNAL MEDICINE

## 2019-03-22 DIAGNOSIS — B99.9 INFECTION: Primary | ICD-10-CM

## 2019-03-22 DIAGNOSIS — T83.69XD: ICD-10-CM

## 2019-03-22 DIAGNOSIS — T83.718D: ICD-10-CM

## 2019-03-22 LAB
ANION GAP SERPL CALCULATED.3IONS-SCNC: 9 MMOL/L (ref 5–15)
BASOPHILS # BLD AUTO: 0.01 10*3/MM3 (ref 0–0.2)
BASOPHILS NFR BLD AUTO: 0.1 % (ref 0–1.5)
BUN BLD-MCNC: 26 MG/DL (ref 7–21)
BUN/CREAT SERPL: 21.8 (ref 7–25)
CALCIUM SPEC-SCNC: 9.1 MG/DL (ref 8.4–10.2)
CHLORIDE SERPL-SCNC: 98 MMOL/L (ref 95–110)
CO2 SERPL-SCNC: 29 MMOL/L (ref 22–31)
CREAT BLD-MCNC: 1.19 MG/DL (ref 0.7–1.3)
DEPRECATED RDW RBC AUTO: 53.7 FL (ref 37–54)
DEPRECATED RDW RBC AUTO: 54.7 FL (ref 37–54)
EOSINOPHIL # BLD AUTO: 0 10*3/MM3 (ref 0–0.4)
EOSINOPHIL NFR BLD AUTO: 0 % (ref 0.3–6.2)
ERYTHROCYTE [DISTWIDTH] IN BLOOD BY AUTOMATED COUNT: 16.1 % (ref 12.3–15.4)
ERYTHROCYTE [DISTWIDTH] IN BLOOD BY AUTOMATED COUNT: 16.4 % (ref 12.3–15.4)
GFR SERPL CREATININE-BSD FRML MDRD: 60 ML/MIN/1.73 (ref 42–98)
GLUCOSE BLD-MCNC: 86 MG/DL (ref 60–100)
HCT VFR BLD AUTO: 34 % (ref 37.5–51)
HCT VFR BLD AUTO: 34.1 % (ref 37.5–51)
HGB BLD-MCNC: 10.7 G/DL (ref 13–17.7)
HGB BLD-MCNC: 10.9 G/DL (ref 13–17.7)
IMM GRANULOCYTES # BLD AUTO: 0.03 10*3/MM3 (ref 0–0.05)
IMM GRANULOCYTES NFR BLD AUTO: 0.4 % (ref 0–0.5)
LYMPHOCYTES # BLD AUTO: 1.31 10*3/MM3 (ref 0.7–3.1)
LYMPHOCYTES NFR BLD AUTO: 16.6 % (ref 19.6–45.3)
MAGNESIUM SERPL-MCNC: 1.9 MG/DL (ref 1.6–2.3)
MCH RBC QN AUTO: 28.9 PG (ref 26.6–33)
MCH RBC QN AUTO: 29.3 PG (ref 26.6–33)
MCHC RBC AUTO-ENTMCNC: 31.4 G/DL (ref 31.5–35.7)
MCHC RBC AUTO-ENTMCNC: 32.1 G/DL (ref 31.5–35.7)
MCV RBC AUTO: 91.4 FL (ref 79–97)
MCV RBC AUTO: 92.2 FL (ref 79–97)
MONOCYTES # BLD AUTO: 0.69 10*3/MM3 (ref 0.1–0.9)
MONOCYTES NFR BLD AUTO: 8.8 % (ref 5–12)
NEUTROPHILS # BLD AUTO: 5.83 10*3/MM3 (ref 1.4–7)
NEUTROPHILS NFR BLD AUTO: 74.1 % (ref 42.7–76)
NRBC BLD AUTO-RTO: 0 /100 WBC (ref 0–0)
PLATELET # BLD AUTO: 255 10*3/MM3 (ref 140–450)
PLATELET # BLD AUTO: 271 10*3/MM3 (ref 140–450)
PMV BLD AUTO: 10.2 FL (ref 6–12)
PMV BLD AUTO: 10.5 FL (ref 6–12)
POTASSIUM BLD-SCNC: 4.4 MMOL/L (ref 3.5–5.1)
RBC # BLD AUTO: 3.7 10*6/MM3 (ref 4.14–5.8)
RBC # BLD AUTO: 3.72 10*6/MM3 (ref 4.14–5.8)
SODIUM BLD-SCNC: 136 MMOL/L (ref 137–145)
WBC NRBC COR # BLD: 6.5 10*3/MM3 (ref 3.4–10.8)
WBC NRBC COR # BLD: 7.87 10*3/MM3 (ref 3.4–10.8)

## 2019-03-22 PROCEDURE — 80048 BASIC METABOLIC PNL TOTAL CA: CPT | Performed by: INTERNAL MEDICINE

## 2019-03-22 PROCEDURE — 25010000002 MORPHINE PER 10 MG: Performed by: INTERNAL MEDICINE

## 2019-03-22 PROCEDURE — 94799 UNLISTED PULMONARY SVC/PX: CPT

## 2019-03-22 PROCEDURE — 25010000002 EPTIFIBATIDE PER 5 MG: Performed by: INTERNAL MEDICINE

## 2019-03-22 PROCEDURE — 85027 COMPLETE CBC AUTOMATED: CPT | Performed by: INTERNAL MEDICINE

## 2019-03-22 PROCEDURE — 94640 AIRWAY INHALATION TREATMENT: CPT

## 2019-03-22 PROCEDURE — 83735 ASSAY OF MAGNESIUM: CPT | Performed by: INTERNAL MEDICINE

## 2019-03-22 PROCEDURE — 25010000002 EPTIFIBATIDE 20 MG/10ML SOLUTION: Performed by: INTERNAL MEDICINE

## 2019-03-22 PROCEDURE — 94760 N-INVAS EAR/PLS OXIMETRY 1: CPT

## 2019-03-22 PROCEDURE — 85025 COMPLETE CBC W/AUTO DIFF WBC: CPT | Performed by: INTERNAL MEDICINE

## 2019-03-22 RX ORDER — ACETAMINOPHEN 325 MG/1
650 TABLET ORAL EVERY 6 HOURS PRN
Status: DISCONTINUED | OUTPATIENT
Start: 2019-03-22 | End: 2019-03-26 | Stop reason: HOSPADM

## 2019-03-22 RX ORDER — POTASSIUM CHLORIDE 750 MG/1
10 CAPSULE, EXTENDED RELEASE ORAL DAILY
Status: DISCONTINUED | OUTPATIENT
Start: 2019-03-23 | End: 2019-03-26 | Stop reason: HOSPADM

## 2019-03-22 RX ORDER — ATORVASTATIN CALCIUM 10 MG/1
10 TABLET, FILM COATED ORAL NIGHTLY
Status: DISCONTINUED | OUTPATIENT
Start: 2019-03-23 | End: 2019-03-26 | Stop reason: HOSPADM

## 2019-03-22 RX ORDER — MORPHINE SULFATE 2 MG/ML
1 INJECTION, SOLUTION INTRAMUSCULAR; INTRAVENOUS
Status: DISCONTINUED | OUTPATIENT
Start: 2019-03-22 | End: 2019-03-24

## 2019-03-22 RX ORDER — I-VITE, TAB 1000-60-2MG (60/BT) 300MCG-200
1 TAB ORAL DAILY
Status: DISCONTINUED | OUTPATIENT
Start: 2019-03-23 | End: 2019-03-26 | Stop reason: HOSPADM

## 2019-03-22 RX ORDER — ALBUTEROL SULFATE 2.5 MG/3ML
2.5 SOLUTION RESPIRATORY (INHALATION) EVERY 6 HOURS PRN
Status: DISCONTINUED | OUTPATIENT
Start: 2019-03-22 | End: 2019-03-26 | Stop reason: HOSPADM

## 2019-03-22 RX ORDER — SODIUM CHLORIDE 0.9 % (FLUSH) 0.9 %
3-10 SYRINGE (ML) INJECTION AS NEEDED
Status: DISCONTINUED | OUTPATIENT
Start: 2019-03-22 | End: 2019-03-26 | Stop reason: HOSPADM

## 2019-03-22 RX ORDER — EPTIFIBATIDE 0.75 MG/ML
2 INJECTION, SOLUTION INTRAVENOUS CONTINUOUS
Status: DISCONTINUED | OUTPATIENT
Start: 2019-03-22 | End: 2019-03-26 | Stop reason: HOSPADM

## 2019-03-22 RX ORDER — EPTIFIBATIDE 20 MG/10ML
180 INJECTION INTRAVENOUS ONCE
Status: COMPLETED | OUTPATIENT
Start: 2019-03-22 | End: 2019-03-22

## 2019-03-22 RX ORDER — FAMOTIDINE 20 MG/1
20 TABLET, FILM COATED ORAL DAILY
Status: DISCONTINUED | OUTPATIENT
Start: 2019-03-23 | End: 2019-03-26 | Stop reason: HOSPADM

## 2019-03-22 RX ORDER — SODIUM CHLORIDE 0.9 % (FLUSH) 0.9 %
3 SYRINGE (ML) INJECTION EVERY 12 HOURS SCHEDULED
Status: DISCONTINUED | OUTPATIENT
Start: 2019-03-22 | End: 2019-03-26 | Stop reason: HOSPADM

## 2019-03-22 RX ORDER — BUDESONIDE AND FORMOTEROL FUMARATE DIHYDRATE 160; 4.5 UG/1; UG/1
2 AEROSOL RESPIRATORY (INHALATION)
Status: DISCONTINUED | OUTPATIENT
Start: 2019-03-22 | End: 2019-03-26 | Stop reason: HOSPADM

## 2019-03-22 RX ORDER — ISOSORBIDE MONONITRATE 30 MG/1
30 TABLET, EXTENDED RELEASE ORAL DAILY
Status: DISCONTINUED | OUTPATIENT
Start: 2019-03-23 | End: 2019-03-26 | Stop reason: HOSPADM

## 2019-03-22 RX ORDER — SUCRALFATE 1 G/1
1 TABLET ORAL
Status: DISCONTINUED | OUTPATIENT
Start: 2019-03-22 | End: 2019-03-26 | Stop reason: HOSPADM

## 2019-03-22 RX ORDER — BUMETANIDE 1 MG/1
2 TABLET ORAL DAILY
Status: DISCONTINUED | OUTPATIENT
Start: 2019-03-23 | End: 2019-03-26 | Stop reason: HOSPADM

## 2019-03-22 RX ORDER — OXYCODONE HYDROCHLORIDE AND ACETAMINOPHEN 5; 325 MG/1; MG/1
1 TABLET ORAL EVERY 6 HOURS PRN
Status: DISCONTINUED | OUTPATIENT
Start: 2019-03-22 | End: 2019-03-26 | Stop reason: HOSPADM

## 2019-03-22 RX ORDER — CARVEDILOL 12.5 MG/1
12.5 TABLET ORAL DAILY
Status: DISCONTINUED | OUTPATIENT
Start: 2019-03-23 | End: 2019-03-26

## 2019-03-22 RX ORDER — NITROGLYCERIN 0.4 MG/1
0.4 TABLET SUBLINGUAL
Status: DISCONTINUED | OUTPATIENT
Start: 2019-03-22 | End: 2019-03-26 | Stop reason: HOSPADM

## 2019-03-22 RX ORDER — LISINOPRIL 2.5 MG/1
2.5 TABLET ORAL EVERY 12 HOURS SCHEDULED
Status: DISCONTINUED | OUTPATIENT
Start: 2019-03-22 | End: 2019-03-26 | Stop reason: HOSPADM

## 2019-03-22 RX ORDER — PREGABALIN 75 MG/1
150 CAPSULE ORAL EVERY 12 HOURS SCHEDULED
Status: DISCONTINUED | OUTPATIENT
Start: 2019-03-22 | End: 2019-03-26 | Stop reason: HOSPADM

## 2019-03-22 RX ADMIN — EPTIFIBATIDE 2 MCG/KG/MIN: 75 INJECTION INTRAVENOUS at 18:54

## 2019-03-22 RX ADMIN — BUDESONIDE AND FORMOTEROL FUMARATE DIHYDRATE 2 PUFF: 160; 4.5 AEROSOL RESPIRATORY (INHALATION) at 20:20

## 2019-03-22 RX ADMIN — PREGABALIN 150 MG: 75 CAPSULE ORAL at 20:43

## 2019-03-22 RX ADMIN — OXYCODONE HYDROCHLORIDE AND ACETAMINOPHEN 1 TABLET: 5; 325 TABLET ORAL at 18:53

## 2019-03-22 RX ADMIN — MORPHINE SULFATE 1 MG: 2 INJECTION, SOLUTION INTRAMUSCULAR; INTRAVENOUS at 14:28

## 2019-03-22 RX ADMIN — MORPHINE SULFATE 1 MG: 2 INJECTION, SOLUTION INTRAMUSCULAR; INTRAVENOUS at 22:52

## 2019-03-22 RX ADMIN — ACETAMINOPHEN 650 MG: 325 TABLET, FILM COATED ORAL at 14:13

## 2019-03-22 RX ADMIN — EPTIFIBATIDE 14000 MCG: 2 INJECTION, SOLUTION INTRAVENOUS at 12:22

## 2019-03-22 RX ADMIN — OXYCODONE HYDROCHLORIDE AND ACETAMINOPHEN 1 TABLET: 5; 325 TABLET ORAL at 11:21

## 2019-03-22 RX ADMIN — SUCRALFATE 1 G: 1 TABLET ORAL at 16:48

## 2019-03-22 RX ADMIN — EPTIFIBATIDE 2 MCG/KG/MIN: 75 INJECTION INTRAVENOUS at 12:22

## 2019-03-23 LAB
ANION GAP SERPL CALCULATED.3IONS-SCNC: 6 MMOL/L (ref 5–15)
BASOPHILS # BLD AUTO: 0.01 10*3/MM3 (ref 0–0.2)
BASOPHILS NFR BLD AUTO: 0.1 % (ref 0–1.5)
BUN BLD-MCNC: 19 MG/DL (ref 7–21)
BUN/CREAT SERPL: 17.9 (ref 7–25)
CALCIUM SPEC-SCNC: 9.5 MG/DL (ref 8.4–10.2)
CHLORIDE SERPL-SCNC: 101 MMOL/L (ref 95–110)
CO2 SERPL-SCNC: 28 MMOL/L (ref 22–31)
CREAT BLD-MCNC: 1.06 MG/DL (ref 0.7–1.3)
DEPRECATED RDW RBC AUTO: 55.3 FL (ref 37–54)
EOSINOPHIL # BLD AUTO: 0 10*3/MM3 (ref 0–0.4)
EOSINOPHIL NFR BLD AUTO: 0 % (ref 0.3–6.2)
ERYTHROCYTE [DISTWIDTH] IN BLOOD BY AUTOMATED COUNT: 16.3 % (ref 12.3–15.4)
GFR SERPL CREATININE-BSD FRML MDRD: 69 ML/MIN/1.73 (ref 42–98)
GLUCOSE BLD-MCNC: 97 MG/DL (ref 60–100)
HCT VFR BLD AUTO: 37.2 % (ref 37.5–51)
HGB BLD-MCNC: 11.4 G/DL (ref 13–17.7)
IMM GRANULOCYTES # BLD AUTO: 0.01 10*3/MM3 (ref 0–0.05)
IMM GRANULOCYTES NFR BLD AUTO: 0.1 % (ref 0–0.5)
LYMPHOCYTES # BLD AUTO: 1.29 10*3/MM3 (ref 0.7–3.1)
LYMPHOCYTES NFR BLD AUTO: 18.6 % (ref 19.6–45.3)
MAGNESIUM SERPL-MCNC: 2.1 MG/DL (ref 1.6–2.3)
MCH RBC QN AUTO: 28.7 PG (ref 26.6–33)
MCHC RBC AUTO-ENTMCNC: 30.6 G/DL (ref 31.5–35.7)
MCV RBC AUTO: 93.7 FL (ref 79–97)
MONOCYTES # BLD AUTO: 0.61 10*3/MM3 (ref 0.1–0.9)
MONOCYTES NFR BLD AUTO: 8.8 % (ref 5–12)
NEUTROPHILS # BLD AUTO: 5.03 10*3/MM3 (ref 1.4–7)
NEUTROPHILS NFR BLD AUTO: 72.4 % (ref 42.7–76)
NRBC BLD AUTO-RTO: 0 /100 WBC (ref 0–0)
PLATELET # BLD AUTO: 261 10*3/MM3 (ref 140–450)
PMV BLD AUTO: 10.7 FL (ref 6–12)
POTASSIUM BLD-SCNC: 4.5 MMOL/L (ref 3.5–5.1)
RBC # BLD AUTO: 3.97 10*6/MM3 (ref 4.14–5.8)
SODIUM BLD-SCNC: 135 MMOL/L (ref 137–145)
WBC NRBC COR # BLD: 6.95 10*3/MM3 (ref 3.4–10.8)

## 2019-03-23 PROCEDURE — 80048 BASIC METABOLIC PNL TOTAL CA: CPT | Performed by: INTERNAL MEDICINE

## 2019-03-23 PROCEDURE — 25010000002 EPTIFIBATIDE PER 5 MG: Performed by: INTERNAL MEDICINE

## 2019-03-23 PROCEDURE — 83735 ASSAY OF MAGNESIUM: CPT | Performed by: INTERNAL MEDICINE

## 2019-03-23 PROCEDURE — 25010000002 CEFTRIAXONE PER 250 MG: Performed by: HOSPITALIST

## 2019-03-23 PROCEDURE — 25010000002 ONDANSETRON PER 1 MG: Performed by: HOSPITALIST

## 2019-03-23 PROCEDURE — 85025 COMPLETE CBC W/AUTO DIFF WBC: CPT | Performed by: INTERNAL MEDICINE

## 2019-03-23 PROCEDURE — 94640 AIRWAY INHALATION TREATMENT: CPT

## 2019-03-23 PROCEDURE — 94799 UNLISTED PULMONARY SVC/PX: CPT

## 2019-03-23 PROCEDURE — 94760 N-INVAS EAR/PLS OXIMETRY 1: CPT

## 2019-03-23 PROCEDURE — 25010000002 MORPHINE PER 10 MG: Performed by: INTERNAL MEDICINE

## 2019-03-23 RX ORDER — ONDANSETRON 2 MG/ML
4 INJECTION INTRAMUSCULAR; INTRAVENOUS EVERY 6 HOURS PRN
Status: DISCONTINUED | OUTPATIENT
Start: 2019-03-23 | End: 2019-03-24

## 2019-03-23 RX ADMIN — OXYCODONE HYDROCHLORIDE AND ACETAMINOPHEN 1 TABLET: 5; 325 TABLET ORAL at 23:22

## 2019-03-23 RX ADMIN — SUCRALFATE 1 G: 1 TABLET ORAL at 08:52

## 2019-03-23 RX ADMIN — ONDANSETRON 4 MG: 2 INJECTION INTRAMUSCULAR; INTRAVENOUS at 15:42

## 2019-03-23 RX ADMIN — I-VITE, TAB 1000-60-2MG (60/BT) 1 TABLET: TAB at 08:52

## 2019-03-23 RX ADMIN — OXYCODONE HYDROCHLORIDE AND ACETAMINOPHEN 1 TABLET: 5; 325 TABLET ORAL at 08:52

## 2019-03-23 RX ADMIN — MORPHINE SULFATE 1 MG: 2 INJECTION, SOLUTION INTRAMUSCULAR; INTRAVENOUS at 21:46

## 2019-03-23 RX ADMIN — ALBUTEROL SULFATE 2.5 MG: 2.5 SOLUTION RESPIRATORY (INHALATION) at 20:31

## 2019-03-23 RX ADMIN — EPTIFIBATIDE 2 MCG/KG/MIN: 75 INJECTION INTRAVENOUS at 03:04

## 2019-03-23 RX ADMIN — ISOSORBIDE MONONITRATE 30 MG: 30 TABLET, EXTENDED RELEASE ORAL at 08:53

## 2019-03-23 RX ADMIN — BUDESONIDE AND FORMOTEROL FUMARATE DIHYDRATE 2 PUFF: 160; 4.5 AEROSOL RESPIRATORY (INHALATION) at 20:31

## 2019-03-23 RX ADMIN — CARVEDILOL 12.5 MG: 12.5 TABLET, FILM COATED ORAL at 08:53

## 2019-03-23 RX ADMIN — BUMETANIDE 2 MG: 1 TABLET ORAL at 08:55

## 2019-03-23 RX ADMIN — BUDESONIDE AND FORMOTEROL FUMARATE DIHYDRATE 2 PUFF: 160; 4.5 AEROSOL RESPIRATORY (INHALATION) at 08:09

## 2019-03-23 RX ADMIN — MORPHINE SULFATE 1 MG: 2 INJECTION, SOLUTION INTRAMUSCULAR; INTRAVENOUS at 12:36

## 2019-03-23 RX ADMIN — OXYCODONE HYDROCHLORIDE AND ACETAMINOPHEN 1 TABLET: 5; 325 TABLET ORAL at 17:35

## 2019-03-23 RX ADMIN — LISINOPRIL 2.5 MG: 2.5 TABLET ORAL at 08:52

## 2019-03-23 RX ADMIN — ATORVASTATIN CALCIUM 10 MG: 10 TABLET, FILM COATED ORAL at 20:25

## 2019-03-23 RX ADMIN — MORPHINE SULFATE 1 MG: 2 INJECTION, SOLUTION INTRAMUSCULAR; INTRAVENOUS at 18:23

## 2019-03-23 RX ADMIN — CEFTRIAXONE 1 G: 1 INJECTION, POWDER, FOR SOLUTION INTRAMUSCULAR; INTRAVENOUS at 10:14

## 2019-03-23 RX ADMIN — EPTIFIBATIDE 2 MCG/KG/MIN: 75 INJECTION INTRAVENOUS at 11:41

## 2019-03-23 RX ADMIN — PREGABALIN 150 MG: 75 CAPSULE ORAL at 08:53

## 2019-03-23 RX ADMIN — PREGABALIN 150 MG: 75 CAPSULE ORAL at 20:25

## 2019-03-23 RX ADMIN — SODIUM CHLORIDE, PRESERVATIVE FREE 3 ML: 5 INJECTION INTRAVENOUS at 10:14

## 2019-03-23 RX ADMIN — LISINOPRIL 2.5 MG: 2.5 TABLET ORAL at 20:25

## 2019-03-23 RX ADMIN — EPTIFIBATIDE 2 MCG/KG/MIN: 75 INJECTION INTRAVENOUS at 20:22

## 2019-03-23 RX ADMIN — SUCRALFATE 1 G: 1 TABLET ORAL at 17:13

## 2019-03-23 RX ADMIN — FAMOTIDINE 20 MG: 20 TABLET ORAL at 08:53

## 2019-03-23 RX ADMIN — POTASSIUM CHLORIDE 10 MEQ: 750 CAPSULE, EXTENDED RELEASE ORAL at 08:53

## 2019-03-24 ENCOUNTER — ANESTHESIA EVENT (OUTPATIENT)
Dept: PERIOP | Facility: HOSPITAL | Age: 74
End: 2019-03-24

## 2019-03-24 LAB
ANION GAP SERPL CALCULATED.3IONS-SCNC: 5 MMOL/L (ref 5–15)
BASOPHILS # BLD AUTO: 0.02 10*3/MM3 (ref 0–0.2)
BASOPHILS NFR BLD AUTO: 0.3 % (ref 0–1.5)
BUN BLD-MCNC: 30 MG/DL (ref 7–21)
BUN/CREAT SERPL: 31.6 (ref 7–25)
CALCIUM SPEC-SCNC: 9.2 MG/DL (ref 8.4–10.2)
CHLORIDE SERPL-SCNC: 101 MMOL/L (ref 95–110)
CO2 SERPL-SCNC: 26 MMOL/L (ref 22–31)
CREAT BLD-MCNC: 0.95 MG/DL (ref 0.7–1.3)
DEPRECATED RDW RBC AUTO: 53.8 FL (ref 37–54)
EOSINOPHIL # BLD AUTO: 0 10*3/MM3 (ref 0–0.4)
EOSINOPHIL NFR BLD AUTO: 0 % (ref 0.3–6.2)
ERYTHROCYTE [DISTWIDTH] IN BLOOD BY AUTOMATED COUNT: 16.5 % (ref 12.3–15.4)
GFR SERPL CREATININE-BSD FRML MDRD: 78 ML/MIN/1.73 (ref 42–98)
GLUCOSE BLD-MCNC: 105 MG/DL (ref 60–100)
HCT VFR BLD AUTO: 33.6 % (ref 37.5–51)
HGB BLD-MCNC: 11.1 G/DL (ref 13–17.7)
IMM GRANULOCYTES # BLD AUTO: 0.02 10*3/MM3 (ref 0–0.05)
IMM GRANULOCYTES NFR BLD AUTO: 0.3 % (ref 0–0.5)
LYMPHOCYTES # BLD AUTO: 1.38 10*3/MM3 (ref 0.7–3.1)
LYMPHOCYTES NFR BLD AUTO: 21.4 % (ref 19.6–45.3)
MAGNESIUM SERPL-MCNC: 2 MG/DL (ref 1.6–2.3)
MCH RBC QN AUTO: 29.7 PG (ref 26.6–33)
MCHC RBC AUTO-ENTMCNC: 33 G/DL (ref 31.5–35.7)
MCV RBC AUTO: 89.8 FL (ref 79–97)
MONOCYTES # BLD AUTO: 0.65 10*3/MM3 (ref 0.1–0.9)
MONOCYTES NFR BLD AUTO: 10.1 % (ref 5–12)
NEUTROPHILS # BLD AUTO: 4.39 10*3/MM3 (ref 1.4–7)
NEUTROPHILS NFR BLD AUTO: 67.9 % (ref 42.7–76)
NRBC BLD AUTO-RTO: 0 /100 WBC (ref 0–0)
PLATELET # BLD AUTO: 242 10*3/MM3 (ref 140–450)
PMV BLD AUTO: 10.4 FL (ref 6–12)
POTASSIUM BLD-SCNC: 4 MMOL/L (ref 3.5–5.1)
RBC # BLD AUTO: 3.74 10*6/MM3 (ref 4.14–5.8)
SODIUM BLD-SCNC: 132 MMOL/L (ref 137–145)
WBC NRBC COR # BLD: 6.46 10*3/MM3 (ref 3.4–10.8)

## 2019-03-24 PROCEDURE — 25010000002 MORPHINE PER 10 MG: Performed by: INTERNAL MEDICINE

## 2019-03-24 PROCEDURE — 94760 N-INVAS EAR/PLS OXIMETRY 1: CPT

## 2019-03-24 PROCEDURE — 94799 UNLISTED PULMONARY SVC/PX: CPT

## 2019-03-24 PROCEDURE — 83735 ASSAY OF MAGNESIUM: CPT | Performed by: INTERNAL MEDICINE

## 2019-03-24 PROCEDURE — 25010000002 ONDANSETRON PER 1 MG: Performed by: INTERNAL MEDICINE

## 2019-03-24 PROCEDURE — 25010000002 PROMETHAZINE PER 50 MG: Performed by: INTERNAL MEDICINE

## 2019-03-24 PROCEDURE — 25010000002 EPTIFIBATIDE PER 5 MG: Performed by: INTERNAL MEDICINE

## 2019-03-24 PROCEDURE — 25010000002 CEFTRIAXONE PER 250 MG: Performed by: HOSPITALIST

## 2019-03-24 PROCEDURE — 80048 BASIC METABOLIC PNL TOTAL CA: CPT | Performed by: INTERNAL MEDICINE

## 2019-03-24 PROCEDURE — 25010000002 ONDANSETRON PER 1 MG: Performed by: HOSPITALIST

## 2019-03-24 PROCEDURE — 85025 COMPLETE CBC W/AUTO DIFF WBC: CPT | Performed by: INTERNAL MEDICINE

## 2019-03-24 RX ORDER — MORPHINE SULFATE 2 MG/ML
1 INJECTION, SOLUTION INTRAMUSCULAR; INTRAVENOUS
Status: DISCONTINUED | OUTPATIENT
Start: 2019-03-24 | End: 2019-03-26 | Stop reason: HOSPADM

## 2019-03-24 RX ORDER — SODIUM CHLORIDE 9 MG/ML
100 INJECTION, SOLUTION INTRAVENOUS CONTINUOUS
Status: DISCONTINUED | OUTPATIENT
Start: 2019-03-24 | End: 2019-03-26 | Stop reason: HOSPADM

## 2019-03-24 RX ORDER — ONDANSETRON HCL IN 0.9 % NACL 8 MG/50 ML
8 INTRAVENOUS SOLUTION, PIGGYBACK (ML) INTRAVENOUS EVERY 8 HOURS PRN
Status: DISCONTINUED | OUTPATIENT
Start: 2019-03-24 | End: 2019-03-26 | Stop reason: HOSPADM

## 2019-03-24 RX ORDER — PROMETHAZINE HYDROCHLORIDE 25 MG/ML
12.5 INJECTION, SOLUTION INTRAMUSCULAR; INTRAVENOUS EVERY 6 HOURS PRN
Status: DISCONTINUED | OUTPATIENT
Start: 2019-03-24 | End: 2019-03-26 | Stop reason: HOSPADM

## 2019-03-24 RX ADMIN — ATORVASTATIN CALCIUM 10 MG: 10 TABLET, FILM COATED ORAL at 20:43

## 2019-03-24 RX ADMIN — LISINOPRIL 2.5 MG: 2.5 TABLET ORAL at 20:43

## 2019-03-24 RX ADMIN — OXYCODONE HYDROCHLORIDE AND ACETAMINOPHEN 1 TABLET: 5; 325 TABLET ORAL at 05:57

## 2019-03-24 RX ADMIN — ONDANSETRON 8 MG: 2 INJECTION, SOLUTION INTRAMUSCULAR; INTRAVENOUS at 19:54

## 2019-03-24 RX ADMIN — BUDESONIDE AND FORMOTEROL FUMARATE DIHYDRATE 2 PUFF: 160; 4.5 AEROSOL RESPIRATORY (INHALATION) at 09:02

## 2019-03-24 RX ADMIN — SUCRALFATE 1 G: 1 TABLET ORAL at 08:20

## 2019-03-24 RX ADMIN — PREGABALIN 150 MG: 75 CAPSULE ORAL at 08:20

## 2019-03-24 RX ADMIN — MORPHINE SULFATE 1 MG: 2 INJECTION, SOLUTION INTRAMUSCULAR; INTRAVENOUS at 10:58

## 2019-03-24 RX ADMIN — POTASSIUM CHLORIDE 10 MEQ: 750 CAPSULE, EXTENDED RELEASE ORAL at 08:20

## 2019-03-24 RX ADMIN — SODIUM CHLORIDE, PRESERVATIVE FREE 3 ML: 5 INJECTION INTRAVENOUS at 20:45

## 2019-03-24 RX ADMIN — I-VITE, TAB 1000-60-2MG (60/BT) 1 TABLET: TAB at 08:20

## 2019-03-24 RX ADMIN — BUMETANIDE 2 MG: 1 TABLET ORAL at 08:20

## 2019-03-24 RX ADMIN — LISINOPRIL 2.5 MG: 2.5 TABLET ORAL at 08:20

## 2019-03-24 RX ADMIN — ISOSORBIDE MONONITRATE 30 MG: 30 TABLET, EXTENDED RELEASE ORAL at 08:20

## 2019-03-24 RX ADMIN — EPTIFIBATIDE 2 MCG/KG/MIN: 75 INJECTION INTRAVENOUS at 05:01

## 2019-03-24 RX ADMIN — SODIUM CHLORIDE, PRESERVATIVE FREE 3 ML: 5 INJECTION INTRAVENOUS at 00:24

## 2019-03-24 RX ADMIN — ONDANSETRON 4 MG: 2 INJECTION INTRAMUSCULAR; INTRAVENOUS at 08:20

## 2019-03-24 RX ADMIN — PROMETHAZINE HYDROCHLORIDE 12.5 MG: 25 INJECTION INTRAMUSCULAR; INTRAVENOUS at 13:13

## 2019-03-24 RX ADMIN — SUCRALFATE 1 G: 1 TABLET ORAL at 17:45

## 2019-03-24 RX ADMIN — PREGABALIN 150 MG: 75 CAPSULE ORAL at 20:43

## 2019-03-24 RX ADMIN — MORPHINE SULFATE 1 MG: 2 INJECTION, SOLUTION INTRAMUSCULAR; INTRAVENOUS at 03:28

## 2019-03-24 RX ADMIN — FAMOTIDINE 20 MG: 20 TABLET ORAL at 08:20

## 2019-03-24 RX ADMIN — CARVEDILOL 12.5 MG: 12.5 TABLET, FILM COATED ORAL at 08:20

## 2019-03-24 RX ADMIN — EPTIFIBATIDE 2 MCG/KG/MIN: 75 INJECTION INTRAVENOUS at 13:49

## 2019-03-24 RX ADMIN — MORPHINE SULFATE 1 MG: 2 INJECTION, SOLUTION INTRAMUSCULAR; INTRAVENOUS at 20:44

## 2019-03-24 RX ADMIN — SODIUM CHLORIDE 100 ML/HR: 900 INJECTION, SOLUTION INTRAVENOUS at 17:44

## 2019-03-24 RX ADMIN — CEFTRIAXONE 1 G: 1 INJECTION, POWDER, FOR SOLUTION INTRAMUSCULAR; INTRAVENOUS at 10:56

## 2019-03-24 RX ADMIN — OXYCODONE HYDROCHLORIDE AND ACETAMINOPHEN 1 TABLET: 5; 325 TABLET ORAL at 13:13

## 2019-03-25 ENCOUNTER — ANESTHESIA (OUTPATIENT)
Dept: PERIOP | Facility: HOSPITAL | Age: 74
End: 2019-03-25

## 2019-03-25 LAB
ANION GAP SERPL CALCULATED.3IONS-SCNC: 5 MMOL/L (ref 5–15)
BASOPHILS # BLD AUTO: 0.02 10*3/MM3 (ref 0–0.2)
BASOPHILS NFR BLD AUTO: 0.3 % (ref 0–1.5)
BUN BLD-MCNC: 29 MG/DL (ref 7–21)
BUN/CREAT SERPL: 34.9 (ref 7–25)
CALCIUM SPEC-SCNC: 8.7 MG/DL (ref 8.4–10.2)
CHLORIDE SERPL-SCNC: 106 MMOL/L (ref 95–110)
CO2 SERPL-SCNC: 27 MMOL/L (ref 22–31)
CREAT BLD-MCNC: 0.83 MG/DL (ref 0.7–1.3)
DEPRECATED RDW RBC AUTO: 55.3 FL (ref 37–54)
EOSINOPHIL # BLD AUTO: 0 10*3/MM3 (ref 0–0.4)
EOSINOPHIL NFR BLD AUTO: 0 % (ref 0.3–6.2)
ERYTHROCYTE [DISTWIDTH] IN BLOOD BY AUTOMATED COUNT: 16.6 % (ref 12.3–15.4)
GFR SERPL CREATININE-BSD FRML MDRD: 91 ML/MIN/1.73 (ref 42–98)
GLUCOSE BLD-MCNC: 95 MG/DL (ref 60–100)
HCT VFR BLD AUTO: 34.2 % (ref 37.5–51)
HGB BLD-MCNC: 10.8 G/DL (ref 13–17.7)
IMM GRANULOCYTES # BLD AUTO: 0.02 10*3/MM3 (ref 0–0.05)
IMM GRANULOCYTES NFR BLD AUTO: 0.3 % (ref 0–0.5)
LYMPHOCYTES # BLD AUTO: 1.48 10*3/MM3 (ref 0.7–3.1)
LYMPHOCYTES NFR BLD AUTO: 21.8 % (ref 19.6–45.3)
MAGNESIUM SERPL-MCNC: 1.9 MG/DL (ref 1.6–2.3)
MCH RBC QN AUTO: 29.3 PG (ref 26.6–33)
MCHC RBC AUTO-ENTMCNC: 31.6 G/DL (ref 31.5–35.7)
MCV RBC AUTO: 92.7 FL (ref 79–97)
MONOCYTES # BLD AUTO: 0.67 10*3/MM3 (ref 0.1–0.9)
MONOCYTES NFR BLD AUTO: 9.9 % (ref 5–12)
NEUTROPHILS # BLD AUTO: 4.6 10*3/MM3 (ref 1.4–7)
NEUTROPHILS NFR BLD AUTO: 67.7 % (ref 42.7–76)
NRBC BLD AUTO-RTO: 0 /100 WBC (ref 0–0)
PLATELET # BLD AUTO: 208 10*3/MM3 (ref 140–450)
PMV BLD AUTO: 10.4 FL (ref 6–12)
POTASSIUM BLD-SCNC: 4.4 MMOL/L (ref 3.5–5.1)
RBC # BLD AUTO: 3.69 10*6/MM3 (ref 4.14–5.8)
SODIUM BLD-SCNC: 138 MMOL/L (ref 137–145)
WBC NRBC COR # BLD: 6.79 10*3/MM3 (ref 3.4–10.8)

## 2019-03-25 PROCEDURE — 0TPD8LZ REMOVAL OF ARTIFICIAL SPHINCTER FROM URETHRA, VIA NATURAL OR ARTIFICIAL OPENING ENDOSCOPIC: ICD-10-PCS | Performed by: INTERNAL MEDICINE

## 2019-03-25 PROCEDURE — 25010000002 ONDANSETRON PER 1 MG: Performed by: NURSE ANESTHETIST, CERTIFIED REGISTERED

## 2019-03-25 PROCEDURE — 94799 UNLISTED PULMONARY SVC/PX: CPT

## 2019-03-25 PROCEDURE — 85025 COMPLETE CBC W/AUTO DIFF WBC: CPT | Performed by: INTERNAL MEDICINE

## 2019-03-25 PROCEDURE — 83735 ASSAY OF MAGNESIUM: CPT | Performed by: INTERNAL MEDICINE

## 2019-03-25 PROCEDURE — 25010000002 FENTANYL CITRATE (PF) 100 MCG/2ML SOLUTION: Performed by: NURSE ANESTHETIST, CERTIFIED REGISTERED

## 2019-03-25 PROCEDURE — 25010000002 DEXAMETHASONE PER 1 MG: Performed by: NURSE ANESTHETIST, CERTIFIED REGISTERED

## 2019-03-25 PROCEDURE — 25010000002 MORPHINE PER 10 MG: Performed by: INTERNAL MEDICINE

## 2019-03-25 PROCEDURE — 25010000002 PROMETHAZINE PER 50 MG: Performed by: INTERNAL MEDICINE

## 2019-03-25 PROCEDURE — 87070 CULTURE OTHR SPECIMN AEROBIC: CPT | Performed by: UROLOGY

## 2019-03-25 PROCEDURE — 94760 N-INVAS EAR/PLS OXIMETRY 1: CPT

## 2019-03-25 PROCEDURE — 25010000002 HYDROMORPHONE 1 MG/ML SOLUTION: Performed by: ANESTHESIOLOGY

## 2019-03-25 PROCEDURE — 25010000002 CEFTRIAXONE PER 250 MG: Performed by: HOSPITALIST

## 2019-03-25 PROCEDURE — 87147 CULTURE TYPE IMMUNOLOGIC: CPT | Performed by: UROLOGY

## 2019-03-25 PROCEDURE — C1758 CATHETER, URETERAL: HCPCS | Performed by: UROLOGY

## 2019-03-25 PROCEDURE — 87186 SC STD MICRODIL/AGAR DIL: CPT | Performed by: UROLOGY

## 2019-03-25 PROCEDURE — 25010000002 PROPOFOL 10 MG/ML EMULSION: Performed by: NURSE ANESTHETIST, CERTIFIED REGISTERED

## 2019-03-25 PROCEDURE — 80048 BASIC METABOLIC PNL TOTAL CA: CPT | Performed by: INTERNAL MEDICINE

## 2019-03-25 PROCEDURE — 25010000002 VANCOMYCIN: Performed by: UROLOGY

## 2019-03-25 PROCEDURE — 25010000002 HYDROMORPHONE 1 MG/ML SOLUTION: Performed by: NURSE ANESTHETIST, CERTIFIED REGISTERED

## 2019-03-25 PROCEDURE — 87205 SMEAR GRAM STAIN: CPT | Performed by: UROLOGY

## 2019-03-25 RX ORDER — ONDANSETRON 2 MG/ML
4 INJECTION INTRAMUSCULAR; INTRAVENOUS ONCE AS NEEDED
Status: DISCONTINUED | OUTPATIENT
Start: 2019-03-25 | End: 2019-03-25 | Stop reason: HOSPADM

## 2019-03-25 RX ORDER — FENTANYL CITRATE 50 UG/ML
INJECTION, SOLUTION INTRAMUSCULAR; INTRAVENOUS AS NEEDED
Status: DISCONTINUED | OUTPATIENT
Start: 2019-03-25 | End: 2019-03-25 | Stop reason: SURG

## 2019-03-25 RX ORDER — PROPOFOL 10 MG/ML
VIAL (ML) INTRAVENOUS AS NEEDED
Status: DISCONTINUED | OUTPATIENT
Start: 2019-03-25 | End: 2019-03-25 | Stop reason: SURG

## 2019-03-25 RX ORDER — ASPIRIN 81 MG/1
81 TABLET ORAL DAILY
Status: DISCONTINUED | OUTPATIENT
Start: 2019-03-25 | End: 2019-03-26 | Stop reason: HOSPADM

## 2019-03-25 RX ORDER — EPHEDRINE SULFATE 50 MG/ML
INJECTION, SOLUTION INTRAVENOUS AS NEEDED
Status: DISCONTINUED | OUTPATIENT
Start: 2019-03-25 | End: 2019-03-25 | Stop reason: SURG

## 2019-03-25 RX ORDER — ALBUTEROL SULFATE 2.5 MG/3ML
2.5 SOLUTION RESPIRATORY (INHALATION) ONCE AS NEEDED
Status: DISCONTINUED | OUTPATIENT
Start: 2019-03-25 | End: 2019-03-25 | Stop reason: HOSPADM

## 2019-03-25 RX ORDER — LIDOCAINE HYDROCHLORIDE 20 MG/ML
INJECTION, SOLUTION INFILTRATION; PERINEURAL AS NEEDED
Status: DISCONTINUED | OUTPATIENT
Start: 2019-03-25 | End: 2019-03-25 | Stop reason: SURG

## 2019-03-25 RX ORDER — CLOPIDOGREL BISULFATE 75 MG/1
75 TABLET ORAL DAILY
Status: DISCONTINUED | OUTPATIENT
Start: 2019-03-25 | End: 2019-03-26 | Stop reason: HOSPADM

## 2019-03-25 RX ORDER — MAGNESIUM HYDROXIDE 1200 MG/15ML
LIQUID ORAL AS NEEDED
Status: DISCONTINUED | OUTPATIENT
Start: 2019-03-25 | End: 2019-03-26 | Stop reason: HOSPADM

## 2019-03-25 RX ORDER — DEXAMETHASONE SODIUM PHOSPHATE 4 MG/ML
INJECTION, SOLUTION INTRA-ARTICULAR; INTRALESIONAL; INTRAMUSCULAR; INTRAVENOUS; SOFT TISSUE AS NEEDED
Status: DISCONTINUED | OUTPATIENT
Start: 2019-03-25 | End: 2019-03-25 | Stop reason: SURG

## 2019-03-25 RX ORDER — ONDANSETRON 2 MG/ML
INJECTION INTRAMUSCULAR; INTRAVENOUS AS NEEDED
Status: DISCONTINUED | OUTPATIENT
Start: 2019-03-25 | End: 2019-03-25 | Stop reason: SURG

## 2019-03-25 RX ADMIN — ATORVASTATIN CALCIUM 10 MG: 10 TABLET, FILM COATED ORAL at 20:38

## 2019-03-25 RX ADMIN — BUMETANIDE 2 MG: 1 TABLET ORAL at 08:41

## 2019-03-25 RX ADMIN — AZTREONAM 2 G: 2 INJECTION, POWDER, FOR SOLUTION INTRAMUSCULAR; INTRAVENOUS at 14:09

## 2019-03-25 RX ADMIN — SODIUM CHLORIDE 100 ML/HR: 900 INJECTION, SOLUTION INTRAVENOUS at 02:47

## 2019-03-25 RX ADMIN — PROMETHAZINE HYDROCHLORIDE 12.5 MG: 25 INJECTION INTRAMUSCULAR; INTRAVENOUS at 21:53

## 2019-03-25 RX ADMIN — VANCOMYCIN HYDROCHLORIDE 1250 MG: 10 INJECTION, POWDER, LYOPHILIZED, FOR SOLUTION INTRAVENOUS at 14:19

## 2019-03-25 RX ADMIN — CARVEDILOL 12.5 MG: 12.5 TABLET, FILM COATED ORAL at 08:41

## 2019-03-25 RX ADMIN — HYDROMORPHONE HYDROCHLORIDE 0.25 MG: 1 INJECTION, SOLUTION INTRAMUSCULAR; INTRAVENOUS; SUBCUTANEOUS at 17:58

## 2019-03-25 RX ADMIN — ALBUTEROL SULFATE 2.5 MG: 2.5 SOLUTION RESPIRATORY (INHALATION) at 19:58

## 2019-03-25 RX ADMIN — BUDESONIDE AND FORMOTEROL FUMARATE DIHYDRATE 2 PUFF: 160; 4.5 AEROSOL RESPIRATORY (INHALATION) at 19:55

## 2019-03-25 RX ADMIN — MORPHINE SULFATE 1 MG: 2 INJECTION, SOLUTION INTRAMUSCULAR; INTRAVENOUS at 09:40

## 2019-03-25 RX ADMIN — LIDOCAINE HYDROCHLORIDE 50 MG: 20 INJECTION, SOLUTION INFILTRATION; PERINEURAL at 14:02

## 2019-03-25 RX ADMIN — BUDESONIDE AND FORMOTEROL FUMARATE DIHYDRATE 2 PUFF: 160; 4.5 AEROSOL RESPIRATORY (INHALATION) at 07:02

## 2019-03-25 RX ADMIN — HYDROMORPHONE HYDROCHLORIDE 0.25 MG: 1 INJECTION, SOLUTION INTRAMUSCULAR; INTRAVENOUS; SUBCUTANEOUS at 17:38

## 2019-03-25 RX ADMIN — FENTANYL CITRATE 25 MCG: 50 INJECTION, SOLUTION INTRAMUSCULAR; INTRAVENOUS at 14:50

## 2019-03-25 RX ADMIN — EPHEDRINE SULFATE 10 MG: 50 INJECTION INTRAVENOUS at 15:53

## 2019-03-25 RX ADMIN — FENTANYL CITRATE 25 MCG: 50 INJECTION, SOLUTION INTRAMUSCULAR; INTRAVENOUS at 14:37

## 2019-03-25 RX ADMIN — SODIUM CHLORIDE, PRESERVATIVE FREE 3 ML: 5 INJECTION INTRAVENOUS at 08:43

## 2019-03-25 RX ADMIN — OXYCODONE HYDROCHLORIDE AND ACETAMINOPHEN 1 TABLET: 5; 325 TABLET ORAL at 21:53

## 2019-03-25 RX ADMIN — LISINOPRIL 2.5 MG: 2.5 TABLET ORAL at 20:38

## 2019-03-25 RX ADMIN — PREGABALIN 150 MG: 75 CAPSULE ORAL at 08:41

## 2019-03-25 RX ADMIN — SODIUM CHLORIDE: 900 INJECTION, SOLUTION INTRAVENOUS at 13:52

## 2019-03-25 RX ADMIN — ISOSORBIDE MONONITRATE 30 MG: 30 TABLET, EXTENDED RELEASE ORAL at 08:41

## 2019-03-25 RX ADMIN — EPHEDRINE SULFATE 10 MG: 50 INJECTION INTRAVENOUS at 16:04

## 2019-03-25 RX ADMIN — PREGABALIN 150 MG: 75 CAPSULE ORAL at 20:38

## 2019-03-25 RX ADMIN — FAMOTIDINE 20 MG: 20 TABLET ORAL at 08:41

## 2019-03-25 RX ADMIN — POTASSIUM CHLORIDE 10 MEQ: 750 CAPSULE, EXTENDED RELEASE ORAL at 08:41

## 2019-03-25 RX ADMIN — FENTANYL CITRATE 25 MCG: 50 INJECTION, SOLUTION INTRAMUSCULAR; INTRAVENOUS at 14:15

## 2019-03-25 RX ADMIN — SUCRALFATE 1 G: 1 TABLET ORAL at 08:43

## 2019-03-25 RX ADMIN — CLOPIDOGREL BISULFATE 75 MG: 75 TABLET ORAL at 20:38

## 2019-03-25 RX ADMIN — HYDROMORPHONE HYDROCHLORIDE 0.25 MG: 1 INJECTION, SOLUTION INTRAMUSCULAR; INTRAVENOUS; SUBCUTANEOUS at 17:48

## 2019-03-25 RX ADMIN — PROPOFOL 120 MG: 10 INJECTION, EMULSION INTRAVENOUS at 14:02

## 2019-03-25 RX ADMIN — CEFTRIAXONE 1 G: 1 INJECTION, POWDER, FOR SOLUTION INTRAMUSCULAR; INTRAVENOUS at 08:46

## 2019-03-25 RX ADMIN — MORPHINE SULFATE 1 MG: 2 INJECTION, SOLUTION INTRAMUSCULAR; INTRAVENOUS at 23:23

## 2019-03-25 RX ADMIN — ASPIRIN 81 MG: 81 TABLET, COATED ORAL at 20:38

## 2019-03-25 RX ADMIN — LISINOPRIL 2.5 MG: 2.5 TABLET ORAL at 08:41

## 2019-03-25 RX ADMIN — MORPHINE SULFATE 1 MG: 2 INJECTION, SOLUTION INTRAMUSCULAR; INTRAVENOUS at 20:38

## 2019-03-25 RX ADMIN — PROMETHAZINE HYDROCHLORIDE 12.5 MG: 25 INJECTION INTRAMUSCULAR; INTRAVENOUS at 04:34

## 2019-03-25 RX ADMIN — SODIUM CHLORIDE: 900 INJECTION, SOLUTION INTRAVENOUS at 14:20

## 2019-03-25 RX ADMIN — EPHEDRINE SULFATE 10 MG: 50 INJECTION INTRAVENOUS at 14:26

## 2019-03-25 RX ADMIN — I-VITE, TAB 1000-60-2MG (60/BT) 1 TABLET: TAB at 08:41

## 2019-03-25 RX ADMIN — DEXAMETHASONE SODIUM PHOSPHATE 4 MG: 4 INJECTION, SOLUTION INTRAMUSCULAR; INTRAVENOUS at 14:09

## 2019-03-25 RX ADMIN — ONDANSETRON 4 MG: 2 INJECTION INTRAMUSCULAR; INTRAVENOUS at 15:47

## 2019-03-25 RX ADMIN — FENTANYL CITRATE 25 MCG: 50 INJECTION, SOLUTION INTRAMUSCULAR; INTRAVENOUS at 16:55

## 2019-03-25 RX ADMIN — EPHEDRINE SULFATE 10 MG: 50 INJECTION INTRAVENOUS at 14:15

## 2019-03-25 RX ADMIN — HYDROMORPHONE HYDROCHLORIDE 0.25 MG: 1 INJECTION, SOLUTION INTRAMUSCULAR; INTRAVENOUS; SUBCUTANEOUS at 17:24

## 2019-03-25 NOTE — ANESTHESIA PREPROCEDURE EVALUATION
Anesthesia Evaluation     Patient summary reviewed   history of anesthetic complications (coded in [place] for spinal cord stimulator):  NPO Solid Status: > 8 hours  NPO Liquid Status: > 8 hours           Airway   Mallampati: II  TM distance: >3 FB  Neck ROM: full  no difficulty expected  Dental    (+) poor dentition    Pulmonary    (+) a smoker Former, COPD severe, asthma, shortness of breath, sleep apnea on CPAP, rhonchi,   (-) decreased breath sounds    ROS comment: Black lung  Home O2 at 2.5L  On steroids  Cardiovascular   Exercise tolerance: poor (<4 METS)    NYHA Classification: III  ECG reviewed  PT is on anticoagulation therapy  Patient on routine beta blocker and Beta blocker given within 24 hours of surgery  Rhythm: regular  Rate: normal    (+) hypertension poorly controlled, past MI  >12 months, CAD, cardiac stents more than 12 months ago PVD, hyperlipidemia,   (-) angina, murmur    ROS comment:   ** Suspect arm lead reversal, interpretation assumes no reversal  Sinus bradycardia with 1st degree AV block  Right bundle branch block , plus right ventricular hypertrophy  Abnormal ECG  When compared with ECG of 08-NOV-2017 15:57,  premature atrial complexes are no longer present  Questionable change in QRS axis  Confirmed by NOA SALCIDO MD    Neuro/Psych  (+) psychiatric history Anxiety,     (-) seizures, CVA, headaches    ROS Comment: Spinal cord stimulator in place.  GI/Hepatic/Renal/Endo    (+)  GERD well controlled,  renal disease,   (-) hepatitis, liver disease, diabetes, hypothyroidism    ROS Comment: Spinal cord stimulator    Musculoskeletal     (+) arthralgias, back pain,   Abdominal     Abdomen: soft.   Substance History - negative use     OB/GYN          Other   (+) arthritis (hands)   history of cancer (prostate)                    Anesthesia Plan    ASA 3     general   (Patient states his chronic cough is at its baseline level as he it taking his own albuterol inhaler as we speak.  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      "

## 2019-03-25 NOTE — ANESTHESIA POSTPROCEDURE EVALUATION
Patient: Angelita Barraza Jr.    Procedure Summary     Date:  03/25/19 Room / Location:  [facility] OR 02 / [facility] OR    Anesthesia Start:  1356 Anesthesia Stop:  1715    Procedure:  CYSTOSCOPY REMOVAL URINARY SPHINCTER PROSTHESIS possible replacement (N/A ) Diagnosis:       Erosion of genitourinary device to surrounding tissue, subsequent encounter      (Erosion of genitourinary device to surrounding tissue, subsequent encounter [T83.998D])    Surgeon:  D'Amico, Anna M., MD Provider:  Miguel Juárez MD    Anesthesia Type:  general ASA Status:  3          Anesthesia Type: general  Last vitals  BP   137/67 (03/25/19 1313)   Temp   97.7 °F (36.5 °C) (03/25/19 1313)   Pulse   57 (03/25/19 1313)   Resp   12 (03/25/19 1313)     SpO2   95 % (03/25/19 1313)     Post Anesthesia Care and Evaluation    Patient location during evaluation: PACU  Patient participation: complete - patient participated  Level of consciousness: awake and alert  Pain score: 1  Pain management: adequate  Airway patency: patent  Anesthetic complications: No anesthetic complications  PONV Status: none  Cardiovascular status: acceptable  Respiratory status: acceptable  Hydration status: acceptable

## 2019-03-25 NOTE — ANESTHESIA PROCEDURE NOTES
Airway  Urgency: elective    Airway not difficult    General Information and Staff    Patient location during procedure: OR  CRNA: Ashli Ceballos CRNA    Indications and Patient Condition  Indications for airway management: airway protection    Preoxygenated: yes  MILS not maintained throughout  Mask difficulty assessment: 0 - not attempted    Final Airway Details  Final airway type: supraglottic airway      Successful airway: I-gel  Size 4    Number of attempts at approach: 1

## 2019-03-26 VITALS
HEART RATE: 75 BPM | DIASTOLIC BLOOD PRESSURE: 58 MMHG | BODY MASS INDEX: 24.01 KG/M2 | SYSTOLIC BLOOD PRESSURE: 110 MMHG | TEMPERATURE: 97.3 F | RESPIRATION RATE: 16 BRPM | HEIGHT: 71 IN | OXYGEN SATURATION: 95 % | WEIGHT: 171.5 LBS

## 2019-03-26 LAB
ANION GAP SERPL CALCULATED.3IONS-SCNC: 11 MMOL/L
BUN BLD-MCNC: 26 MG/DL (ref 8–23)
BUN/CREAT SERPL: 29.9 (ref 7–25)
CALCIUM SPEC-SCNC: 8.6 MG/DL (ref 8.6–10.5)
CHLORIDE SERPL-SCNC: 104 MMOL/L (ref 98–107)
CO2 SERPL-SCNC: 25 MMOL/L (ref 22–29)
CREAT BLD-MCNC: 0.87 MG/DL (ref 0.76–1.27)
DEPRECATED RDW RBC AUTO: 55.1 FL (ref 37–54)
ERYTHROCYTE [DISTWIDTH] IN BLOOD BY AUTOMATED COUNT: 16.7 % (ref 12.3–15.4)
GFR SERPL CREATININE-BSD FRML MDRD: 86 ML/MIN/1.73
GLUCOSE BLD-MCNC: 132 MG/DL (ref 65–99)
HCT VFR BLD AUTO: 31.4 % (ref 37.5–51)
HGB BLD-MCNC: 9.7 G/DL (ref 13–17.7)
MAGNESIUM SERPL-MCNC: 1.7 MG/DL (ref 1.6–2.4)
MCH RBC QN AUTO: 28.7 PG (ref 26.6–33)
MCHC RBC AUTO-ENTMCNC: 30.9 G/DL (ref 31.5–35.7)
MCV RBC AUTO: 92.9 FL (ref 79–97)
PLATELET # BLD AUTO: 244 10*3/MM3 (ref 140–450)
PMV BLD AUTO: 10.7 FL (ref 6–12)
POTASSIUM BLD-SCNC: 4.3 MMOL/L (ref 3.5–5.2)
RBC # BLD AUTO: 3.38 10*6/MM3 (ref 4.14–5.8)
SODIUM BLD-SCNC: 140 MMOL/L (ref 136–145)
WBC NRBC COR # BLD: 12.1 10*3/MM3 (ref 3.4–10.8)

## 2019-03-26 PROCEDURE — 93010 ELECTROCARDIOGRAM REPORT: CPT | Performed by: INTERNAL MEDICINE

## 2019-03-26 PROCEDURE — 80048 BASIC METABOLIC PNL TOTAL CA: CPT | Performed by: INTERNAL MEDICINE

## 2019-03-26 PROCEDURE — 94760 N-INVAS EAR/PLS OXIMETRY 1: CPT

## 2019-03-26 PROCEDURE — 94799 UNLISTED PULMONARY SVC/PX: CPT

## 2019-03-26 PROCEDURE — 25010000002 MORPHINE PER 10 MG: Performed by: INTERNAL MEDICINE

## 2019-03-26 PROCEDURE — 85027 COMPLETE CBC AUTOMATED: CPT | Performed by: INTERNAL MEDICINE

## 2019-03-26 PROCEDURE — 25010000002 CEFTRIAXONE: Performed by: HOSPITALIST

## 2019-03-26 PROCEDURE — 83735 ASSAY OF MAGNESIUM: CPT | Performed by: INTERNAL MEDICINE

## 2019-03-26 PROCEDURE — 93005 ELECTROCARDIOGRAM TRACING: CPT | Performed by: INTERNAL MEDICINE

## 2019-03-26 RX ORDER — SULFAMETHOXAZOLE AND TRIMETHOPRIM 800; 160 MG/1; MG/1
1 TABLET ORAL 2 TIMES DAILY
Qty: 14 TABLET | Refills: 0 | Status: SHIPPED | OUTPATIENT
Start: 2019-03-26 | End: 2019-04-02

## 2019-03-26 RX ORDER — PHENAZOPYRIDINE HYDROCHLORIDE 100 MG/1
100 TABLET, FILM COATED ORAL 3 TIMES DAILY PRN
Qty: 21 TABLET | Refills: 0 | Status: SHIPPED | OUTPATIENT
Start: 2019-03-26

## 2019-03-26 RX ADMIN — I-VITE, TAB 1000-60-2MG (60/BT) 1 TABLET: TAB at 08:33

## 2019-03-26 RX ADMIN — LISINOPRIL 2.5 MG: 2.5 TABLET ORAL at 08:34

## 2019-03-26 RX ADMIN — CLOPIDOGREL BISULFATE 75 MG: 75 TABLET ORAL at 08:33

## 2019-03-26 RX ADMIN — ALBUTEROL SULFATE 2.5 MG: 2.5 SOLUTION RESPIRATORY (INHALATION) at 07:20

## 2019-03-26 RX ADMIN — POTASSIUM CHLORIDE 10 MEQ: 750 CAPSULE, EXTENDED RELEASE ORAL at 08:33

## 2019-03-26 RX ADMIN — FAMOTIDINE 20 MG: 20 TABLET ORAL at 08:33

## 2019-03-26 RX ADMIN — SODIUM CHLORIDE, PRESERVATIVE FREE 3 ML: 5 INJECTION INTRAVENOUS at 08:40

## 2019-03-26 RX ADMIN — CEFTRIAXONE 1 G: 1 INJECTION, POWDER, FOR SOLUTION INTRAMUSCULAR; INTRAVENOUS at 11:00

## 2019-03-26 RX ADMIN — ASPIRIN 81 MG: 81 TABLET, COATED ORAL at 08:33

## 2019-03-26 RX ADMIN — BUMETANIDE 2 MG: 1 TABLET ORAL at 08:34

## 2019-03-26 RX ADMIN — SUCRALFATE 1 G: 1 TABLET ORAL at 08:34

## 2019-03-26 RX ADMIN — OXYCODONE HYDROCHLORIDE AND ACETAMINOPHEN 1 TABLET: 5; 325 TABLET ORAL at 11:00

## 2019-03-26 RX ADMIN — PREGABALIN 150 MG: 75 CAPSULE ORAL at 08:32

## 2019-03-26 RX ADMIN — ISOSORBIDE MONONITRATE 30 MG: 30 TABLET, EXTENDED RELEASE ORAL at 08:34

## 2019-03-26 RX ADMIN — BUDESONIDE AND FORMOTEROL FUMARATE DIHYDRATE 2 PUFF: 160; 4.5 AEROSOL RESPIRATORY (INHALATION) at 07:26

## 2019-03-26 RX ADMIN — MORPHINE SULFATE 1 MG: 2 INJECTION, SOLUTION INTRAMUSCULAR; INTRAVENOUS at 01:28

## 2019-03-26 RX ADMIN — ALBUTEROL SULFATE 2.5 MG: 2.5 SOLUTION RESPIRATORY (INHALATION) at 01:00

## 2019-03-27 ENCOUNTER — READMISSION MANAGEMENT (OUTPATIENT)
Dept: CALL CENTER | Facility: HOSPITAL | Age: 74
End: 2019-03-27

## 2019-03-27 NOTE — OUTREACH NOTE
Prep Survey      Responses   Facility patient discharged from?  [place]   Is patient eligible?  Yes   Discharge diagnosis  Erosion of genitourinary device to surrounding tissue, Infection, s/p Cystoscopy removal of urinary sphincter prosthesis   Does the patient have one of the following disease processes/diagnoses(primary or secondary)?  General Surgery   Does the patient have Home health ordered?  Yes   What is the Home health agency?   [facility]    Is there a DME ordered?  No   Comments regarding appointments  See AVS   Prep survey completed?  Yes          Anna Davis RN

## 2019-03-28 ENCOUNTER — READMISSION MANAGEMENT (OUTPATIENT)
Dept: CALL CENTER | Facility: HOSPITAL | Age: 74
End: 2019-03-28

## 2019-03-28 NOTE — OUTREACH NOTE
General Surgery Week 1 Survey      Responses   Facility patient discharged from?  [place]   Does the patient have one of the following disease processes/diagnoses(primary or secondary)?  General Surgery   Is there a successful TCM telephone encounter documented?  No   Week 1 attempt successful?  Yes   Call start time  0802   Call end time  0814   Is patient permission given to speak with other caregiver?  Yes   List who call center can speak with  any family member   Meds reviewed with patient/caregiver?  Yes   Is the patient having any side effects they believe may be caused by any medication additions or changes?  No   Does the patient have all medications related to this admission filled (includes all antibiotics, pain medications, etc.)  Yes   Is the patient taking all medications as directed (includes completed medication regime)?  Yes   Does the patient have a follow up appointment scheduled with their surgeon?  Yes   Has the patient kept scheduled appointments due by today?  N/A   Comments  States has surgeon's appt today.   What is the Home health agency?   [facility]    Has home health visited the patient within 72 hours of discharge?  Yes   Home health comments  Nurse has visited.   Psychosocial issues?  No   Psychosocial comments  Family member lives with him.   Did the patient receive a copy of their discharge instructions?  Yes   Nursing interventions  Reviewed instructions with patient [Does not use computer.]   What is the patient's perception of their health status since discharge?  Improving   Nursing interventions  Nurse provided patient education   Is the patient /caregiver able to teach back basic post-op care?  Continue use of incentive spirometry at least 1 week post discharge, Practice 'cough and deep breath', Drive as instructed by MD in discharge instructions, Take showers only when approved by MD-sponge bathe until then, No tub bath, swimming, or hot tub until instructed by MD, Keep  incision areas clean,dry and protected, Lifting as instructed by MD in discharge instructions   Is the patient/caregiver able to teach back signs and symptoms of incisional infection?  Increased redness, swelling or pain at the incisonal site, Increased drainage or bleeding, Incisional warmth, Pus or odor from incision, Fever   Is the patient/caregiver able to teach back steps to recovery at home?  Set small, achievable goals for return to baseline health, Rest and rebuild strength, gradually increase activity, Practice good oral hygiene, Eat a well-balance diet, Make a list of questions for surgeon's appointment   If the patient is a current smoker, are they able to teach back resources for cessation?  -- [non smoker]   Is the patient/caregiver able to teach back the hierarchy of who to call/visit for symptoms/problems? PCP, Specialist, Home health nurse, Urgent Care, ED, 911  Yes   Week 1 call completed?  Yes   Wrap up additional comments  States is feeling better-still has jones catheter. States will be seeing surgeon today-some scrotal swelling and using ice packs.           Gail Betancourt RN

## 2019-03-29 LAB
BACTERIA SPEC AEROBE CULT: ABNORMAL
BACTERIA SPEC AEROBE CULT: ABNORMAL
GRAM STN SPEC: ABNORMAL
GRAM STN SPEC: ABNORMAL

## 2019-04-04 ENCOUNTER — READMISSION MANAGEMENT (OUTPATIENT)
Dept: CALL CENTER | Facility: HOSPITAL | Age: 74
End: 2019-04-04

## 2019-04-06 ENCOUNTER — READMISSION MANAGEMENT (OUTPATIENT)
Dept: CALL CENTER | Facility: HOSPITAL | Age: 74
End: 2019-04-06

## 2019-04-06 NOTE — OUTREACH NOTE
General Surgery Week 2 Survey      Responses   Facility patient discharged from?  [place]   Does the patient have one of the following disease processes/diagnoses(primary or secondary)?  General Surgery   Week 2 attempt successful?  Yes   Call start time  1105   Call end time  1108   Medication alerts for this patient  medications are completed   Meds reviewed with patient/caregiver?  Yes   Is the patient taking all medications as directed (includes completed medication regime)?  Yes   Does the patient have a follow up appointment scheduled with their surgeon?  Yes   Has the patient kept scheduled appointments due by today?  Yes   Comments  has a  appointment  of the 4/17   What is the patient's perception of their health status since discharge?  Improving   Week 2 call completed?  Yes   Wrap up additional comments  Still has a jones and will be removed and the patient states he is incontinent          Naomi Stout RN

## 2019-04-15 ENCOUNTER — READMISSION MANAGEMENT (OUTPATIENT)
Dept: CALL CENTER | Facility: HOSPITAL | Age: 74
End: 2019-04-15

## 2019-04-15 NOTE — OUTREACH NOTE
General Surgery Week 3 Survey      Responses   Facility patient discharged from?  [place]   Does the patient have one of the following disease processes/diagnoses(primary or secondary)?  General Surgery   Week 3 attempt successful?  No   Unsuccessful attempts  Attempt 1          Destin Boyce RN

## 2019-04-17 DIAGNOSIS — T83.69XD: Primary | ICD-10-CM

## 2019-05-13 ENCOUNTER — HOSPITAL ENCOUNTER (OUTPATIENT)
Dept: CT IMAGING | Facility: HOSPITAL | Age: 74
Discharge: HOME | End: 2019-05-13
Admitting: UROLOGY

## 2019-05-13 DIAGNOSIS — T83.69XD: ICD-10-CM

## 2019-05-13 PROCEDURE — 25010000002 IOPAMIDOL 61 % SOLUTION: Performed by: UROLOGY

## 2019-05-13 PROCEDURE — 72194 CT PELVIS W/O & W/DYE: CPT

## 2019-05-13 RX ADMIN — IOPAMIDOL 90 ML: 612 INJECTION, SOLUTION INTRAVENOUS at 08:55

## 2019-05-16 ENCOUNTER — OFFICE VISIT (OUTPATIENT)
Dept: SURGERY | Facility: CLINIC | Age: 74
End: 2019-05-16

## 2019-05-16 VITALS
TEMPERATURE: 98 F | BODY MASS INDEX: 23.44 KG/M2 | WEIGHT: 167.4 LBS | DIASTOLIC BLOOD PRESSURE: 60 MMHG | HEIGHT: 71 IN | HEART RATE: 64 BPM | SYSTOLIC BLOOD PRESSURE: 140 MMHG

## 2019-05-16 DIAGNOSIS — I25.709 CORONARY ARTERY DISEASE INVOLVING CORONARY BYPASS GRAFT OF NATIVE HEART WITH ANGINA PECTORIS (HCC): Primary | ICD-10-CM

## 2019-05-16 PROCEDURE — 99213 OFFICE O/P EST LOW 20 MIN: CPT | Performed by: SURGERY

## 2019-05-16 NOTE — PATIENT INSTRUCTIONS

## 2019-05-19 NOTE — PROGRESS NOTES
Chief Complaint   Patient presents with   • Follow-up     Recheck ventral hernia        HPI  Check of multiple hernias of the ventral surface and groins. No hx of incarceration or obstruction. Multiple procedures recently to attempt to correct damaged ureteral prosthesis  Past Medical History:   Diagnosis Date   • Abdominal pain     no hernias noted- multiple repairs      • Anesthesia complication     states he , cardiac arrest for 2 min. with spinal cord stimulator surgery   • Asthma    • Backache    • Black lung disease (CMS/HCC)    • Cataract    • Constipation    • Coronary artery disease    • DJD (degenerative joint disease)     involving multiple joints   • Dyspnea     with exertion   • Emphysema of lung (CMS/HCC)    • Essential hypertension    • Generalized anxiety disorder    • Hallux valgus     right 2nd toe   • Hearing loss    • Heartburn    • Hemorrhoids    • Incisional hernia with obstruction, without gangrene    • Malignant tumor of prostate (CMS/HCC)    • Mass of left thigh 2018   • MI (myocardial infarction) (CMS/HCC)    • MRSA infection    • Plantar fasciitis     right heel   • PVD (peripheral vascular disease) (CMS/HCC)    • Sleep apnea     using c-pap   • Sleep disorder    • Urinary incontinence        Past Surgical History:   Procedure Laterality Date   • BLADDER SURGERY      AMS urinary control system   • CARDIAC SURGERY      cabg   • CYSTOSCOPY REMOVAL URINARY SPHINCTER PROSTHESIS N/A 3/25/2019    Procedure: CYSTOSCOPY REMOVAL URINARY SPHINCTER PROSTHESIS possible replacement;  Surgeon: D'Amico, Anna M., MD;  Location: [facility];  Service: Urology   • CYSTOSCOPY RETROGRADE PYELOGRAM Right 2019    Procedure: Cystoscopy;  Surgeon: D'Amico, Anna M., MD;  Location: [facility];  Service: Urology   • ENDOSCOPY AND COLONOSCOPY  2011    Diverticulosis in sigmoid colon. Hemorrhoids in anus. Edema of rectal mucosa. Multiple biopsies taken. Lipoma in cecum. Multiple biopsies taken    • HERNIA REPAIR  08/17/2004    Left inguinal hernioplasty with mesh   • PROSTATECTOMY     • SPINAL CORD STIMULATOR IMPLANT     • TRANSESOPHAGEAL ECHOCARDIOGRAM ([place])  07/09/2008    without color flow-Normal systolic function with evidence of left ventricular hypertrophy   • TRUNK LESION/CYST EXCISION Left 7/12/2018    Procedure: EXCISION MASS LEFT HIP;  Surgeon: Derik Michelle MD;  Location: [facility];  Service: General   • UMBILICAL HERNIA REPAIR  09/12/2006    Primary umbilical hernioplasty   • VENTRAL HERNIA REPAIR N/A 11/14/2017    Procedure: LAPAROSCOPIC POSSIBLE OPEN VENTRAL/INCISIONAL HERNIA REPAIR WITH MESH;  Surgeon: Derik Michelle MD;  Location: [facility];  Service:          Current Outpatient Medications:   •  albuterol (PROVENTIL HFA;VENTOLIN HFA) 108 (90 BASE) MCG/ACT inhaler, Inhale 2 puffs Every 4 (Four) Hours As Needed for wheezing., Disp: , Rfl:   •  albuterol (PROVENTIL) (2.5 MG/3ML) 0.083% nebulizer solution, Inhale 2.5 mg 4 (Four) Times a Day., Disp: , Rfl:   •  aspirin 81 MG EC tablet, Take 81 mg by mouth Daily. Last dose 11/7/17, Disp: , Rfl:   •  bumetanide (BUMEX) 2 MG tablet, Take 2 mg by mouth Daily., Disp: , Rfl: 3  •  clopidogrel (PLAVIX) 75 MG tablet, Take 75 mg by mouth Daily. Last dose 11/7/17, Disp: , Rfl:   •  DULERA 200-5 MCG/ACT inhaler, Inhale 2 puffs 2 (Two) Times a Day., Disp: , Rfl: 11  •  famotidine (PEPCID) 20 MG tablet, Take 20 mg by mouth Daily. Taking at lunch, Disp: , Rfl:   •  isosorbide mononitrate (IMDUR) 30 MG 24 hr tablet, Take 30 mg by mouth Daily., Disp: , Rfl:   •  lisinopril (PRINIVIL,ZESTRIL) 2.5 MG tablet, Take 2.5 mg by mouth 2 (Two) Times a Day., Disp: , Rfl:   •  Multiple Vitamins-Minerals (MULTIVITAMIN ADULT PO), Take 1 tablet by mouth Daily., Disp: , Rfl:   •  nitroglycerin (NITROSTAT) 0.4 MG SL tablet, Place 0.4 mg under the tongue Every 5 (Five) Minutes As Needed for chest pain. Take no more than 3 doses in 15 minutes., Disp: , Rfl:   •  O2 (OXYGEN),  Inhale 2 L/min Daily., Disp: , Rfl:   •  phenazopyridine (PYRIDIUM) 100 MG tablet, Take 1 tablet by mouth 3 (Three) Times a Day As Needed for bladder spasms., Disp: 21 tablet, Rfl: 0  •  potassium chloride (K-DUR) 10 MEQ CR tablet, Take 10 mEq by mouth Daily. Takes at lunch, Disp: , Rfl:   •  pravastatin (PRAVACHOL) 40 MG tablet, Take 40 mg by mouth Every Night., Disp: , Rfl:   •  pregabalin (LYRICA) 150 MG capsule, Take 150 mg by mouth 2 (Two) Times a Day., Disp: , Rfl:   •  SPIRIVA RESPIMAT 2.5 MCG/ACT aerosol solution, Inhale 1 puff 2 (Two) Times a Day., Disp: , Rfl: 11  •  sucralfate (CARAFATE) 1 G tablet, Take 1 g by mouth 2 (Two) Times a Day., Disp: , Rfl:   •  tapentadol (NUCYNTA) 75 MG tablet, Take 75 mg by mouth 4 (Four) Times a Day As Needed., Disp: , Rfl:     Allergies   Allergen Reactions   • Codeine Nausea And Vomiting   • Contrast Dye Rash       Family History   Problem Relation Age of Onset   • Cancer Other    • Diabetes Other    • Heart disease Other    • Hypertension Other      Physical Exam      ASSESSMENT    Angelita was seen today for follow-up.    Diagnoses and all orders for this visit:    Coronary artery disease involving coronary bypass graft of native heart with angina pectoris (CMS/HCC)  -     Ambulatory Referral to Cardiology        PLAN    1. Recheck in 3 months              This document has been electronically signed by Derik Michelle MD on May 19, 2019 5:49 PM

## 2019-12-31 NOTE — ANESTHESIA PREPROCEDURE EVALUATION
Anesthesia Evaluation     no history of anesthetic complications:  NPO Solid Status: > 8 hours  NPO Liquid Status: > 2 hours           Airway   Mallampati: II  TM distance: >3 FB  Neck ROM: full  no difficulty expected  Dental    (+) poor dentition    Pulmonary    (+) a smoker Former, COPD severe, asthma, shortness of breath, sleep apnea on CPAP, decreased breath sounds,     ROS comment: Black lung  Cardiovascular   Exercise tolerance: poor (<4 METS)    ECG reviewed  PT is on anticoagulation therapy  Patient on routine beta blocker  Rhythm: regular  Rate: normal    (+) hypertension poorly controlled, past MI  >12 months, CAD, CABG > 6 Months, cardiac stents more than 12 months ago PVD, hyperlipidemia,   (-) angina, murmur    ROS comment:   ** Suspect arm lead reversal, interpretation assumes no reversal  Sinus bradycardia with 1st degree AV block  Right bundle branch block , plus right ventricular hypertrophy  Abnormal ECG  When compared with ECG of 08-NOV-2017 15:57,  premature atrial complexes are no longer present  Questionable change in QRS axis  Confirmed by NOA SALCIDO MD    Neuro/Psych  (+) psychiatric history Anxiety,       ROS Comment: Spinal cord stimulator in place.  GI/Hepatic/Renal/Endo    (+)  GERD well controlled,      ROS Comment: Spinal cord stimulator    Musculoskeletal     (+) arthralgias,   Abdominal     Abdomen: soft.   Substance History - negative use     OB/GYN          Other   (+) arthritis (hands)   history of cancer (prostate)                      Anesthesia Plan    ASA 4     MAC     intravenous induction   Anesthetic plan and risks discussed with patient and child.       spoke with patient's son regarding drug name, use, indication, side effects, monitoring, compliance, avoidance of NSAIDs if possible. Performed teach back and patient's son was able to verbalize teaching points. spoke with patient's son Chris regarding drug name, use, indication, side effects, monitoring, compliance, avoidance of NSAIDs if possible, what to do in cases of falls and injury. Performed teach back and patient's son was able to verbalize teaching points.

## 2022-06-08 NOTE — PROGRESS NOTES
Chief Complaint   Patient presents with   • Follow-up     Recheck mass left hip        HPI  Doing very well.  He has had no recurrence of the mass on his posterior hip.  All wounds are healing nicely.  Past Medical History:   Diagnosis Date   • Abdominal pain     no hernias noted- multiple repairs      • Anesthesia complication     states he , cardiac arrest for 2 min. with spinal cord stimulator surgery   • Asthma    • Backache    • Black lung disease (CMS/HCC)    • Cataract    • Constipation    • Coronary artery disease    • DJD (degenerative joint disease)     involving multiple joints   • Dyspnea     with exertion   • Emphysema of lung (CMS/HCC)    • Essential hypertension    • Generalized anxiety disorder    • Hallux valgus     right 2nd toe   • Hearing loss    • Heartburn    • Hemorrhoids    • Incisional hernia with obstruction, without gangrene    • Malignant tumor of prostate (CMS/HCC)    • Mass of left thigh 2018   • MI (myocardial infarction)    • MRSA infection    • Plantar fasciitis     right heel   • PVD (peripheral vascular disease) (CMS/HCC)    • Sleep apnea     using c-pap   • Sleep disorder    • Urinary incontinence        Past Surgical History:   Procedure Laterality Date   • BLADDER SURGERY      AMS urinary control system   • CARDIAC SURGERY      cabg   • ENDOSCOPY AND COLONOSCOPY  2011    Diverticulosis in sigmoid colon. Hemorrhoids in anus. Edema of rectal mucosa. Multiple biopsies taken. Lipoma in cecum. Multiple biopsies taken   • HERNIA REPAIR  2004    Left inguinal hernioplasty with mesh   • PROSTATECTOMY     • SPINAL CORD STIMULATOR IMPLANT     • TRANSESOPHAGEAL ECHOCARDIOGRAM ([place])  2008    without color flow-Normal systolic function with evidence of left ventricular hypertrophy   • TRUNK LESION/CYST EXCISION Left 2018    Procedure: EXCISION MASS LEFT HIP;  Surgeon: Derik Michelle MD;  [facility];  Service: General   • UMBILICAL HERNIA REPAIR   Patient states OS has never been as strong as OD since childhood. 09/12/2006    Primary umbilical hernioplasty   • VENTRAL HERNIA REPAIR N/A 11/14/2017    Procedure: LAPAROSCOPIC POSSIBLE OPEN VENTRAL/INCISIONAL HERNIA REPAIR WITH MESH;  Surgeon: Derik Michelle MD;  Location: [facility];  Service:          Current Outpatient Prescriptions:   •  acetaminophen (TYLENOL) 650 MG 8 hr tablet, Take 650 mg by mouth Every 8 (Eight) Hours As Needed., Disp: , Rfl:   •  albuterol (PROVENTIL HFA;VENTOLIN HFA) 108 (90 BASE) MCG/ACT inhaler, Inhale 2 puffs Every 4 (Four) Hours As Needed for wheezing., Disp: , Rfl:   •  albuterol (PROVENTIL) (2.5 MG/3ML) 0.083% nebulizer solution, Inhale 2.5 mg 4 (Four) Times a Day., Disp: , Rfl:   •  aluminum hydroxide-mag trisilicate (GAVISCON) 80-20 MG chewable tablet chewable tablet, Chew 1 tablet As Needed., Disp: , Rfl:   •  aspirin 81 MG EC tablet, Take 81 mg by mouth Daily. Last dose 11/7/17, Disp: , Rfl:   •  budesonide (PULMICORT) 0.5 MG/2ML nebulizer solution, , Disp: , Rfl: 11  •  bumetanide (BUMEX) 2 MG tablet, Take 2 mg by mouth Daily., Disp: , Rfl: 3  •  carvedilol (COREG) 12.5 MG tablet, Take 12.5 mg by mouth 2 (Two) Times a Day., Disp: , Rfl:   •  cetirizine (zyrTEC) 10 MG tablet, , Disp: , Rfl: 3  •  clonazePAM (KlonoPIN) 0.5 MG tablet, Take 0.25 mg by mouth At Night As Needed., Disp: , Rfl:   •  clopidogrel (PLAVIX) 75 MG tablet, Take 75 mg by mouth Daily. Last dose 11/7/17, Disp: , Rfl:   •  cyclobenzaprine (FLEXERIL) 10 MG tablet, Take 10 mg by mouth 3 (Three) Times a Day As Needed for muscle spasms., Disp: , Rfl:   •  DALIRESP 500 MCG tablet tablet, Take 500 mcg by mouth Daily., Disp: , Rfl: 11  •  DULERA 200-5 MCG/ACT inhaler, Inhale 2 puffs 2 (Two) Times a Day., Disp: , Rfl: 11  •  famotidine (PEPCID) 20 MG tablet, Take 20 mg by mouth Daily. Taking at lunch, Disp: , Rfl:   •  HYDROcodone-acetaminophen (NORCO) 7.5-325 MG per tablet, Take 1 tablet by mouth Every 4 (Four) Hours As Needed for Moderate Pain  (Pain)., Disp: 20 tablet, Rfl: 0  •   isosorbide mononitrate (IMDUR) 30 MG 24 hr tablet, Take 30 mg by mouth Daily., Disp: , Rfl:   •  ketoconazole (NIZORAL) 2 % shampoo, Apply  topically Daily., Disp: , Rfl:   •  lisinopril (PRINIVIL,ZESTRIL) 2.5 MG tablet, Take 2.5 mg by mouth 2 (Two) Times a Day., Disp: , Rfl:   •  MUCINEX 600 MG 12 hr tablet, Take 600 mg by mouth 2 (Two) Times a Day., Disp: , Rfl: 4  •  Multiple Vitamins-Minerals (MULTIVITAMIN ADULT PO), Take 1 tablet by mouth Daily., Disp: , Rfl:   •  nitroglycerin (NITROSTAT) 0.4 MG SL tablet, Place 0.4 mg under the tongue Every 5 (Five) Minutes As Needed for chest pain. Take no more than 3 doses in 15 minutes., Disp: , Rfl:   •  O2 (OXYGEN), Inhale 2 L/min Daily., Disp: , Rfl:   •  potassium chloride (K-DUR) 10 MEQ CR tablet, Take 10 mEq by mouth Daily. Takes at lunch, Disp: , Rfl:   •  pravastatin (PRAVACHOL) 40 MG tablet, Take 40 mg by mouth Every Night., Disp: , Rfl:   •  pregabalin (LYRICA) 150 MG capsule, Take 150 mg by mouth 2 (Two) Times a Day., Disp: , Rfl:   •  promethazine (PHENERGAN) 25 MG tablet, Take 25 mg by mouth Every 6 (Six) Hours As Needed., Disp: , Rfl:   •  SPIRIVA RESPIMAT 2.5 MCG/ACT aerosol solution, Inhale 1 puff 2 (Two) Times a Day., Disp: , Rfl: 11  •  sucralfate (CARAFATE) 1 G tablet, Take 1 g by mouth 2 (Two) Times a Day., Disp: , Rfl:   •  sulfamethoxazole-trimethoprim (BACTRIM DS) 800-160 MG per tablet, Take 1 tablet by mouth 2 (Two) Times a Day., Disp: 20 tablet, Rfl: 0  •  sulfamethoxazole-trimethoprim (BACTRIM DS,SEPTRA DS) 800-160 MG per tablet, Take 160 mg by mouth., Disp: , Rfl:   •  tapentadol (NUCYNTA) 75 MG tablet, Take 75 mg by mouth 4 (Four) Times a Day As Needed., Disp: , Rfl:   •  amoxicillin (AMOXIL) 500 MG capsule, Take 1 capsule by mouth 3 (Three) Times a Day., Disp: , Rfl: 0    Allergies   Allergen Reactions   • Codeine Nausea And Vomiting   • Contrast Dye Rash       Family History   Problem Relation Age of Onset   • Cancer Other    • Diabetes Other     • Heart disease Other    • Hypertension Other        Social History     Social History   • Marital status:      Spouse name: N/A   • Number of children: N/A   • Years of education: N/A     Occupational History   • Not on file.     Social History Main Topics   • Smoking status: Former Smoker     Years: 25.00     Types: Cigarettes     Quit date: 2011   • Smokeless tobacco: Never Used   • Alcohol use No   • Drug use: No   • Sexual activity: Defer     Other Topics Concern   • Not on file     Social History Narrative   • No narrative on file       Review of Systems  No changes since last visit  Physical Exam   Skin:              ASSESSMENT    Claudcorie was seen today for follow-up.    Diagnoses and all orders for this visit:    Mass of left thigh        PLAN    1.  Recheck in 2 months per usual checked              This document has been electronically signed by Derik Michelle MD on August 27, 2018 11:00 PM

## (undated) DEVICE — GLV SURG SENSICARE GREEN W/ALOE PF LF 6.5 STRL

## (undated) DEVICE — SYR 10ML

## (undated) DEVICE — SOL IRR H2O BTL 1000ML STRL

## (undated) DEVICE — DRN WND JP RND W TROC SIL 15F 3/16IN

## (undated) DEVICE — SKIN AFFIX SURG ADHESIVE 72/CS 0.55ML: Brand: MEDLINE

## (undated) DEVICE — DRSNG WND GZ CURAD OIL EMULSION 3X3IN STRL

## (undated) DEVICE — RETR RNG GEN2 28.6X18.3CM

## (undated) DEVICE — PK LAP CHOLE LF 60

## (undated) DEVICE — MONOPOLAR METZENBAUM SCISSOR TIP, DISPOSABLE: Brand: MONOPOLAR METZENBAUM SCISSOR TIP, DISPOSABLE

## (undated) DEVICE — GLV SURG NEOLON 2G PF LF 6.5 STRL

## (undated) DEVICE — GLV SURG SENSICARE GREEN W/ALOE PF LF 7 STRL

## (undated) DEVICE — CATH URETRL OPN/END 5F70CM

## (undated) DEVICE — STERILE POLYISOPRENE POWDER-FREE SURGICAL GLOVES WITH EMOLLIENT COATING: Brand: PROTEXIS

## (undated) DEVICE — GLV SURG SENSICARE POLYISPRN W/ALOE PF LF 6.5 GRN STRL

## (undated) DEVICE — DRSNG TELFA PAD NONADH STR 1S 3X8IN

## (undated) DEVICE — SYR LL TP 10ML STRL

## (undated) DEVICE — GLV SURG SENSICARE GREEN W/ALOE PF LF 8 STRL

## (undated) DEVICE — CATHETER,FOLEY,100%SILICONE,18FR,10ML,LF: Brand: MEDLINE

## (undated) DEVICE — COVER,MAYO STAND,STERILE: Brand: MEDLINE

## (undated) DEVICE — CATHETER,FOLEY,100%SILICONE,16FR,10ML,LF: Brand: MEDLINE

## (undated) DEVICE — SPNG GZ WOVN 4X4IN 12PLY 10/BX STRL

## (undated) DEVICE — SUT ETHLN 3-0 FS118IN 663H

## (undated) DEVICE — ANTIBACTERIAL UNDYED BRAIDED (POLYGLACTIN 910), SYNTHETIC ABSORBABLE SUTURE: Brand: COATED VICRYL

## (undated) DEVICE — SUT VICRYL 3-0 SH-1 PO 18IN J772D

## (undated) DEVICE — NDL HYPO SFTY GLD 22G 1 1/2IN

## (undated) DEVICE — NDL SPINE 18G 31/2IN PNK

## (undated) DEVICE — 1016 S-DRAPE IRRIG POUCH 10/BOX: Brand: STERI-DRAPE™

## (undated) DEVICE — SOL PVPI SPRY BETADINE 3OZ

## (undated) DEVICE — GLV SURG TRIUMPH LT PF LTX 7 STRL

## (undated) DEVICE — SOL IRR NACL 0.9PCT 3000ML

## (undated) DEVICE — ADHS SKIN MASTISOL CAP 2OZ DISP

## (undated) DEVICE — GLV SURG SENSICARE POLYISPRN W/ALOE PF LF 6 GRN STRL

## (undated) DEVICE — SUT CV-0 GORE 1/2CIR 37MM 36TPR 36IN 0N07B

## (undated) DEVICE — SOL IRR NACL 0.9PCT BT 1000ML

## (undated) DEVICE — TROCAR SITE CLOSURE DEVICE: Brand: ENDO CLOSE

## (undated) DEVICE — DRP SURG U/BUTT W/PCH BACK STRL

## (undated) DEVICE — HARMONIC ACE +7 LAPAROSCOPIC SHEARS ADVANCED HEMOSTASIS 5MM DIAMETER 36CM SHAFT LENGTH  FOR USE WITH GRAY HAND PIECE ONLY: Brand: HARMONIC ACE

## (undated) DEVICE — GOWN ,SIRUS ,NONREINFORCED 4XL: Brand: MEDLINE

## (undated) DEVICE — RESERVOIR,SUCTION,100CC,SILICONE: Brand: MEDLINE

## (undated) DEVICE — GLV SURG TRIUMPH LT PF LTX 7.5 STRL

## (undated) DEVICE — GAUZE,SPONGE,4"X4",16PLY,XRAY,STRL,LF: Brand: MEDLINE

## (undated) DEVICE — PK CYSTO LF 60

## (undated) DEVICE — RETR DEEP SCROTAL SKW

## (undated) DEVICE — SUT NLY 2/0 664G

## (undated) DEVICE — GAUZE,SPONGE,FLUFF,6"X6.75",STRL,5/TRAY: Brand: MEDLINE

## (undated) DEVICE — COUNT NDL FOAM STRIP W/MAG 60CT

## (undated) DEVICE — CATHETER,FOLEY,100%SILICONE,12FR,10ML,LF: Brand: MEDLINE

## (undated) DEVICE — GLV SURG SENSICARE GREEN W/ALOE PF LF 6 STRL

## (undated) DEVICE — RETR STAY HK ELAS SHRP 5MM PK/8

## (undated) DEVICE — GLV SURG TRIUMPH PF LTX 6.5 STRL

## (undated) DEVICE — DRSNG SPNG GZ WOVN 8PLY 4X4IN 2PK LF STRL BX/50PK

## (undated) DEVICE — PENCL E/S HNDSWCH PUSHBTN HOLSTR 10FT

## (undated) DEVICE — KT BRST TISS EXPANDR CUSTOM FILL

## (undated) DEVICE — PAD GRND REM POLYHESIVE A/ DISP

## (undated) DEVICE — DRN PENRS 1/4X18IN LTX

## (undated) DEVICE — SUT ETHLN 4/0 P3 18IN 699H

## (undated) DEVICE — SUT GUT CHRM 3/0 SH 27IN G122H

## (undated) DEVICE — SOL IRRIG NACL 1000ML

## (undated) DEVICE — 3M™ STERI-STRIP™ REINFORCED ADHESIVE SKIN CLOSURES, R1547, 1/2 IN X 4 IN (12 MM X 100 MM), 6 STRIPS/ENVELOPE: Brand: 3M™ STERI-STRIP™

## (undated) DEVICE — SUT PDS CLOSURE CT1 1/0 27IN Z341H

## (undated) DEVICE — SOL SCRB POVIDONE IODINE

## (undated) DEVICE — PK MAJ PROC LF 60

## (undated) DEVICE — GLV SURG TRIUMPH LT PF LTX 6 STRL

## (undated) DEVICE — TRY IRR

## (undated) DEVICE — SUT VIC 3/0 SH 27IN J416H

## (undated) DEVICE — TP SXN YANKR BLB TIP W/TBG 10F LF STRL

## (undated) DEVICE — SUT ETHLN 4/0 FS2 18IN 662G

## (undated) DEVICE — DRAINBAG,ANTI-REFLUX TOWER,L/F,2000ML,LL: Brand: MEDLINE

## (undated) DEVICE — ENDOSCOPIC SEAL URO 1 SIZE FITS ALL: Brand: ENDOSCOPIC SEAL

## (undated) DEVICE — DECANT BG O JET

## (undated) DEVICE — SUT GUT CHRM 4/0 RB1 27IN U203H

## (undated) DEVICE — GLV SURG SENSICARE ALOE LF PF SZ7.5 GRN

## (undated) DEVICE — UNDRPD BREATH 23X36 BG/10

## (undated) DEVICE — LIGHT HANDLE: Brand: DEVON

## (undated) DEVICE — GOWN,AURORA,NOREINF,RAGLAN,XL,STERILE: Brand: MEDLINE

## (undated) DEVICE — SOL PREP POVIDONE IODINE

## (undated) DEVICE — STERILE POLYISOPRENE POWDER-FREE SURGICAL GLOVES: Brand: PROTEXIS

## (undated) DEVICE — SPNG DRN AMD EXCILON 6PLY 4X4IN PK/2

## (undated) DEVICE — ENDOPATH XCEL BLADELESS TROCARS WITH STABILITY SLEEVES: Brand: ENDOPATH XCEL

## (undated) DEVICE — GLV SURG TRIUMPH LT PF LTX 6.5 STRL

## (undated) DEVICE — GLW STD STR 3CM .035X150CM

## (undated) DEVICE — 3M™ IOBAN™ 2 ANTIMICROBIAL INCISE DRAPE 6651EZ: Brand: IOBAN™ 2

## (undated) DEVICE — GLV SURG SENSICARE PI PF LF 7 GRN STRL

## (undated) DEVICE — CATHETER,FOLEY,COUDE,LATEX,20FR,10ML: Brand: MEDLINE